# Patient Record
Sex: FEMALE | Race: WHITE | Employment: FULL TIME | URBAN - METROPOLITAN AREA
[De-identification: names, ages, dates, MRNs, and addresses within clinical notes are randomized per-mention and may not be internally consistent; named-entity substitution may affect disease eponyms.]

---

## 2018-01-16 ENCOUNTER — OFFICE VISIT (OUTPATIENT)
Dept: PLASTIC SURGERY | Facility: CLINIC | Age: 38
End: 2018-01-16
Payer: COMMERCIAL

## 2018-01-16 VITALS
OXYGEN SATURATION: 96 % | DIASTOLIC BLOOD PRESSURE: 81 MMHG | SYSTOLIC BLOOD PRESSURE: 129 MMHG | HEART RATE: 93 BPM | WEIGHT: 172.5 LBS | HEIGHT: 67 IN | BODY MASS INDEX: 27.07 KG/M2

## 2018-01-16 DIAGNOSIS — S31.40XA OPEN WOUND OF GENITAL LABIA, INITIAL ENCOUNTER: Primary | ICD-10-CM

## 2018-01-16 PROBLEM — L72.9 SKIN CYST: Status: ACTIVE | Noted: 2017-04-25

## 2018-01-16 PROBLEM — T14.8XXA OPEN WOUND: Status: ACTIVE | Noted: 2017-09-22

## 2018-01-16 RX ORDER — LEVALBUTEROL TARTRATE 45 UG/1
1 AEROSOL, METERED ORAL
COMMUNITY
Start: 2015-12-08

## 2018-01-16 RX ORDER — METHYLPHENIDATE HYDROCHLORIDE EXTENDED RELEASE 20 MG/1
20 TABLET ORAL
COMMUNITY
Start: 2018-01-29

## 2018-01-16 RX ORDER — NORGESTIMATE AND ETHINYL ESTRADIOL 0.25-0.035
1 KIT ORAL
COMMUNITY
Start: 2017-12-15

## 2018-01-16 RX ORDER — FEXOFENADINE HCL AND PSEUDOEPHEDRINE HCL 180; 240 MG/1; MG/1
1 TABLET, EXTENDED RELEASE ORAL
COMMUNITY
Start: 2014-10-14

## 2018-01-16 RX ORDER — AZELASTINE HCL 205.5 UG/1
SPRAY NASAL
COMMUNITY
Start: 2017-12-05

## 2018-01-16 RX ORDER — SUMATRIPTAN 100 MG/1
100 TABLET, FILM COATED ORAL
COMMUNITY
Start: 2017-04-30

## 2018-01-16 ASSESSMENT — PAIN SCALES - GENERAL: PAINLEVEL: MILD PAIN (3)

## 2018-01-16 NOTE — LETTER
"1/16/2018       RE: Natasha Woodruff  [address] [place] [address]     Dear Colleague,    Thank you for referring your patient, Natasha Woodruff, to the [facility] PLASTIC AND RECONSTRUCTIVE SURGERY at [facility]. Please see a copy of my visit note below.    PLASTICS NEW WOUND    This is a 37-year-old female who was referred by one of our medical product reps Augie Simon.  Apparently back in 10/2016, she developed a \"cyst\" involving kind of the right posterior labial/gluteal crease.  Her primary care provider suggested they use heat or something to bring it to the surface and ultimately it drained spontaneously and started to bleed and there was a \"tunnel\" in the tissues.  It sounds like she was not instructed on any kind of dressing cares and ultimately developed an infection and fever that was I&D'd and closed by her primary care provider.  This not surprisingly dehisced and she ended up with a general surgeon who did another I&D and a delayed primary closure along with a month of antibiotics but that also dehisced.  She has been worked up and is negative for any kind of STDs, any kind of autoimmune disease or any kind of inflammatory bowel disease or metabolic disease.  Just this past month on 12/12, she saw a general vascular surgeon and an ultrasound in the [facility] system showed a small pocket and tunnel that measured 9 x 7 x 5 mm and was filled hypoechoic fluid.  He took her to the OR, excised the area and closed it in what sounds like 2 layers.  The pathology came back as inflammatory and scar tissue, nothing suspicious.  It does not look like any cultures were sent.  While the majority of that incision is still intact, she is starting to have her what she calls cyclical process that she starts to see fluid underneath a small blister and ultimately that drains spontaneously.  The last time it drained, she noted a brown pasty kind of substance coming from the wound.  As " I mentioned before, she has never done any significant wound care packing.  She lives alone and is somewhat squeamish about this and it may be somewhat difficult just due to the position.  That being said, however, I have patients who have managed to figure out how to do their perineal dressing changes on their own.  She does sometimes use Bactroban on the surface of the wound and that seems to at least facilitate reepithelialization of the wound, but ultimately if there is any kind of bacteria trapped under that she will have this recurring drainage problem.  Currently, she is on doxycycline.  We will see what happens when she finishes this course.  The wound itself is less than 5 mm and is not really an open wound at this time.  It does, however, have the appearance of maybe hypertrophic granulation tissue in the middle of an old scar.  This is adjacent to the posterior vagina on the right, kind of in the crease between the inner thigh and the gluteal cleft.  There is hair in this area, but a lot of it has already come off with Band-Aids that she places there.  Some of the thoughts that come to mind is either pyoderma or localized hidradenitis, although she has no other disease elsewhere.  This could also just be a case of bacterial biofilm is an area where there are many bacteria that could be contaminating her wound or her closures.      SOCIAL HISTORY:  As mentioned, she lives alone.  She is a nonsmoker.  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      I gave her 2 options today.  The first is to just try using Vashe on a 3/4-inch packing gauze once it does spontaneously drain and break open.  She would need to insert that at least for 10 minutes and probably should keep it in all day if at all possible.      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  Otherwise, we can always add her on to our OR schedule, and I would do excision, exploration and delayed primary closure under general anesthesia.  We will then not only pack but also cultures and might even inject some dye to see if there is any kind of fistulous tract.  We also mentioned that I would try to close things in layers to eradicate any dead space after copious irrigation with triple antibiotic solution.  Any kind of suture material would be buried and sealed with Dermabond, although in this area it might not last for very long. Lastly, I would require her to offload this area as much as possible with regards to sitting.  None of these options sound terribly inviting to her, but she will let us know if she wants us to proceed with any kind of surgical approach.  Otherwise, I will leave things open on a p.r.n. basis if she develops a wound and needs to come back for followup on that.      Total time spent with this patient was at least 20 minutes, greater than half of which was definitely spent on trying to present surgical options as well as other alternative care plan.                Again, thank you for allowing me to participate in the care of your patient.      Sincerely,    Aaliyah De La Fuente MD

## 2018-01-16 NOTE — MR AVS SNAPSHOT
After Visit Summary   2018    Natasha Woodruff    MRN: [id number]           Patient Information     Date Of Birth          1980        Visit Information        Provider Department      2018 4:30 PM Aaliyah De La Fuente MD [facility] Plastic and Reconstructive Surgery        Today's Diagnoses     Open wound of genital labia, initial encounter    -  1       Follow-ups after your visit        Your next 10 appointments already scheduled     2018  4:15 PM CST   Nurse Visit with  Surgery Nurse   General Surgery ([facility])    [address] [place] [address]  4[address] [facility] [address] [phone]              Who to contact     Please call your clinic at [phone] to:    Ask questions about your health    Make or cancel appointments    Discuss your medicines    Learn about your test results    Speak to your doctor   If you have compliments or concerns about an experience at your clinic, or if you wish to file a complaint, please contact [facility] Patient Relations at [phone] or email us at [email or web address]         Additional Information About Your Visit        MyChart Information     Zumper is an electronic gateway that provides easy, online access to your medical records. With Zumper, you can request a clinic appointment, read your test results, renew a prescription or communicate with your care team.     To sign up for Zumper visit the website at [email or web address]   You will be asked to enter the access code listed below, as well as some personal information. Please follow the directions to create your username and password.     Your access code is: [email or web address]  Expires: 2018  6:30 AM     Your access code will  in 90 days. If you need help or a new code, please contact your [facility] or call [phone] for assistance.        Care EveryWhere ID      "This is your Care EveryWhere ID. This could be used by other organizations to access your [place] medical records  [id number]        Your Vitals Were     Pulse Height Pulse Oximetry BMI (Body Mass Index)          93 5' 6.5\" 96% 27.43 kg/m2         Blood Pressure from Last 3 Encounters:   01/16/18 129/81    Weight from Last 3 Encounters:   01/16/18 172 lb 8 oz              Today, you had the following     No orders found for display       Primary Care Provider    None Specified       No primary provider on file.        Equal Access to Services     KARLOS OLIVEIRA : Hadii aad ku hadasho Soomaali, waaxda luqadaha, qaybta kaalmada adeegyada, julia ascencioshoshanaasaf cole . So [facility] [phone].    ATENCIÓN: Si habla español, tiene a liu disposición servicios gratuitos de asistencia lingüística. Llame al [phone].    We comply with applicable federal civil rights laws and [place] laws. We do not discriminate on the basis of race, color, national origin, age, disability, sex, sexual orientation, or gender identity.            Thank you!     Thank you for choosing [facility] PLASTIC AND RECONSTRUCTIVE SURGERY  for your care. Our goal is always to provide you with excellent care. Hearing back from our patients is one way we can continue to improve our services. Please take a few minutes to complete the written survey that you may receive in the mail after your visit with us. Thank you!             Your Updated Medication List - Protect others around you: Learn how to safely use, store and throw away your medicines at www.disposemymeds.org.          This list is accurate as of: 1/16/18 11:59 PM.  Always use your most recent med list.                   Brand Name Dispense Instructions for use Diagnosis    Azelastine HCl 0.15 % Soln           fexofenadine-pseudoePHEDrine 180-240 MG per 24 hr tablet    ALLEGRA-D 24     Take 1 tablet by mouth        levalbuterol 45 MCG/ACT Inhaler    XOPENEX HFA     Inhale 1 puff " into the lungs        methylphenidate 20 MG CR tablet   Start taking on:  1/29/2018    METADATE ER     Take 20 mg by mouth        norgestimate-ethinyl estradiol 0.25-35 MG-MCG per tablet    ORTHO-CYCLEN, SPRINTEC     Take 1 tablet by mouth        SUMAtriptan 100 MG tablet    IMITREX     Take 100 mg by mouth

## 2018-01-16 NOTE — NURSING NOTE
"Chief Complaint   Patient presents with     Clinic Care Coordination - Initial     new       Vitals:    01/16/18 1631   BP: 129/81   Pulse: 93   SpO2: 96%   Weight: 172 lb 8 oz   Height: 5' 6.5\"       Body mass index is 27.43 kg/(m^2).    Smita HAYNES LPN                        "

## 2018-01-16 NOTE — PROGRESS NOTES
"PLASTICS NEW WOUND    This is a 37-year-old female who was referred by one of our medical product reps Augie Simon.  Apparently back in 10/2016, she developed a \"cyst\" involving kind of the right posterior labial/gluteal crease.  Her primary care provider suggested they use heat or something to bring it to the surface and ultimately it drained spontaneously and started to bleed and there was a \"tunnel\" in the tissues.  It sounds like she was not instructed on any kind of dressing cares and ultimately developed an infection and fever that was I&D'd and closed by her primary care provider.  This not surprisingly dehisced and she ended up with a general surgeon who did another I&D and a delayed primary closure along with a month of antibiotics but that also dehisced.  She has been worked up and is negative for any kind of STDs, any kind of autoimmune disease or any kind of inflammatory bowel disease or metabolic disease.  Just this past month on 12/12, she saw a general vascular surgeon and an ultrasound in the [facility] system showed a small pocket and tunnel that measured 9 x 7 x 5 mm and was filled hypoechoic fluid.  He took her to the OR, excised the area and closed it in what sounds like 2 layers.  The pathology came back as inflammatory and scar tissue, nothing suspicious.  It does not look like any cultures were sent.  While the majority of that incision is still intact, she is starting to have her what she calls cyclical process that she starts to see fluid underneath a small blister and ultimately that drains spontaneously.  The last time it drained, she noted a brown pasty kind of substance coming from the wound.  As I mentioned before, she has never done any significant wound care packing.  She lives alone and is somewhat squeamish about this and it may be somewhat difficult just due to the position.  That being said, however, I have patients who have managed to figure out how to do their perineal dressing " changes on their own.  She does sometimes use Bactroban on the surface of the wound and that seems to at least facilitate reepithelialization of the wound, but ultimately if there is any kind of bacteria trapped under that she will have this recurring drainage problem.  Currently, she is on doxycycline.  We will see what happens when she finishes this course.  The wound itself is less than 5 mm and is not really an open wound at this time.  It does, however, have the appearance of maybe hypertrophic granulation tissue in the middle of an old scar.  This is adjacent to the posterior vagina on the right, kind of in the crease between the inner thigh and the gluteal cleft.  There is hair in this area, but a lot of it has already come off with Band-Aids that she places there.  Some of the thoughts that come to mind is either pyoderma or localized hidradenitis, although she has no other disease elsewhere.  This could also just be a case of bacterial biofilm is an area where there are many bacteria that could be contaminating her wound or her closures.      SOCIAL HISTORY:  As mentioned, she lives alone.  She is a nonsmoker.  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      I gave her 2 options today.  The first is to just try using Vashe on a 3/4-inch packing gauze once it does spontaneously drain and break open.  She would need to insert that at least for 10 minutes and probably should keep it in all day if at all possible.      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  Otherwise, we can always add her on to our OR schedule, and I would do excision, exploration and delayed primary closure under general anesthesia.  We will then not only pack but also cultures and  might even inject some dye to see if there is any kind of fistulous tract.  We also mentioned that I would try to close things in layers to eradicate any dead space after copious irrigation with triple antibiotic solution.  Any kind of suture material would be buried and sealed with Dermabond, although in this area it might not last for very long. Lastly, I would require her to offload this area as much as possible with regards to sitting.  None of these options sound terribly inviting to her, but she will let us know if she wants us to proceed with any kind of surgical approach.  Otherwise, I will leave things open on a p.r.n. basis if she develops a wound and needs to come back for followup on that.      Total time spent with this patient was at least 20 minutes, greater than half of which was definitely spent on trying to present surgical options as well as other alternative care plan.

## 2018-01-16 NOTE — LETTER
Date:January 23, 2018      Patient was self referred, no letter generated. Do not send.        [facility] Health Information

## 2018-01-24 ENCOUNTER — ALLIED HEALTH/NURSE VISIT (OUTPATIENT)
Dept: SURGERY | Facility: CLINIC | Age: 38
End: 2018-01-24
Payer: COMMERCIAL

## 2018-01-24 DIAGNOSIS — T14.8XXA OPEN WOUND: Primary | ICD-10-CM

## 2018-01-24 NOTE — MR AVS SNAPSHOT
After Visit Summary   2018    Natasha Woodruff    MRN: [id number]           Patient Information     Date Of Birth          1980        Visit Information        Provider Department      2018 4:15 PM Nurse,  Surgery General Surgery        Today's Diagnoses     Open wound    -  1       Follow-ups after your visit        Your next 10 appointments already scheduled     2018  2:30 PM CST   (Arrive by 2:15 PM)   Return Plastic Surgery with Aaliyah De La Fuente MD   [facility] Plastic and Reconstructive Surgery ([facility])    [address] [place] [address] [phone]           Do not wear perfume.              Who to contact     Please call your clinic at [phone] to:    Ask questions about your health    Make or cancel appointments    Discuss your medicines    Learn about your test results    Speak to your doctor   If you have compliments or concerns about an experience at your clinic, or if you wish to file a complaint, please contact [facility] Patient Relations at [phone] or email us at [email or web address]         Additional Information About Your Visit        MyChart Information     [employer] is an electronic gateway that provides easy, online access to your medical records. With [employer], you can request a clinic appointment, read your test results, renew a prescription or communicate with your care team.     To sign up for [employer] visit the website at [email or web address]   You will be asked to enter the access code listed below, as well as some personal information. Please follow the directions to create your username and password.     Your access code is: [email or web address]  Expires: 2018  6:30 AM     Your access code will  in 90 days. If you need help or a new code, please contact your [facility] or call [phone] for assistance.         Care EveryWhere ID     This is your Care EveryWhere ID. This could be used by other organizations to access your [place] medical records  [id number]         Blood Pressure from Last 3 Encounters:   01/16/18 129/81    Weight from Last 3 Encounters:   01/16/18 172 lb 8 oz              Today, you had the following     No orders found for display       Primary Care Provider    None Specified       No primary provider on file.        Equal Access to Services     [facility]: Hadii aad ku hadasho Soomaali, waaxda luqadaha, qaybta kaalmada adeegyada, waxay marissain hayroselynn adeoneil gloriaasaf cole . So [facility] [phone].    ATENCIÓN: Si habla español, tiene a liu disposición servicios gratuitos de asistencia lingüística. Llame al [phone].    We comply with applicable federal civil rights laws and [place] laws. We do not discriminate on the basis of race, color, national origin, age, disability, sex, sexual orientation, or gender identity.            Thank you!     Thank you for choosing GENERAL SURGERY  for your care. Our goal is always to provide you with excellent care. Hearing back from our patients is one way we can continue to improve our services. Please take a few minutes to complete the written survey that you may receive in the mail after your visit with us. Thank you!             Your Updated Medication List - Protect others around you: Learn how to safely use, store and throw away your medicines at www.disposemymeds.org.          This list is accurate as of 1/24/18 11:59 PM.  Always use your most recent med list.                   Brand Name Dispense Instructions for use Diagnosis    Azelastine HCl 0.15 % Soln           fexofenadine-pseudoePHEDrine 180-240 MG per 24 hr tablet    ALLEGRA-D 24     Take 1 tablet by mouth        levalbuterol 45 MCG/ACT Inhaler    XOPENEX HFA     Inhale 1 puff into the lungs        methylphenidate 20 MG CR tablet    METADATE ER     Take 20 mg by mouth         norgestimate-ethinyl estradiol 0.25-35 MG-MCG per tablet    ORTHO-CYCLEN, SPRINTEC     Take 1 tablet by mouth        SUMAtriptan 100 MG tablet    IMITREX     Take 100 mg by mouth

## 2018-02-07 NOTE — PROGRESS NOTES
"Natasha Woodruff comes into clinic today at the request of Dr. De La Fuente Ordering Provider      Wound Check Action taken: Wound care and check and Pt. Instruction: Care of wound and action follow up of care for wound.     She has history of a Open surgical wound due to Surgical Wound:Post I&D Cyst drainage with previous provider and closure which redeveloped into a tunnel with drainage.     Clean gloves are donned and current dressing removed. Previous treatment includes: none  This is treatment week:1    Objective findings:      Location: right, kind of in the crease between the inner thigh and the gluteal cleft in the right posterior labial/gluteal crease    Wound measured.: L 0.3 cm x, W 0.3 cm x, D 0.6  cm, Full thickness.    Wound description: no sign of infection    Tenderness:sometimes    Odor: none     Not inflamed.    Drainage is light, serosanguinous and tan     Tunneling:  N/A      Undermining: N/A    Wound Base: not visible     Palpation of the wound bed:  firm    Slough appearance:         Eschar appearance:  none       Periwound Skin: intact,      Color: normal and consistent with surrounding tissue     Subjective finding:     Pt states: Wound is limiting her life due to place it is.  She is worried about causing infection whenever she urinates or has a bowel movement. She is also worried as she can't reach the area readily to clean or access the wound as effectively as a wound nurse can.  Distance is an issue.       Patient is assessed for discomfort which is: minimal    Today's status of the wound: unchanged    Treatment: Removal of existing dressing  Visual inspection  Cleansing with technicare solution  Irrigation with sterile saline solution  Application of sterile gauze soaked with Vashe for 10 mins  Wound packing with 1/8\" packing strip dampened with vashe and covered with 2x2 gauze covered with bandaid.       Pt received the following instructions:      Cleansing with PCMX Soap.Irrigate with " "Normal Saline Primary Dressing 1/8\" packing strip dampened with vashe. Secondary Dressing: 2x2 gauze.   Secure with: bandage.  Frequency:Once daily.   Signs and symptoms of infection taught.      Patient was given small tegaderm to place over dressing whenever she uses the toilet for bowel movements to help minimize issues with contamination from stool.     Patient needs to be seen 1 month. Treated and follow-up appointment made.     Marialuisa Grider CNP  was available for supervision of care if needed or if questions should arise and regarding plan of care.  Dary Diop LPN                  "

## 2018-02-20 ENCOUNTER — OFFICE VISIT (OUTPATIENT)
Dept: PLASTIC SURGERY | Facility: CLINIC | Age: 38
End: 2018-02-20
Payer: COMMERCIAL

## 2018-02-20 VITALS
OXYGEN SATURATION: 98 % | SYSTOLIC BLOOD PRESSURE: 136 MMHG | TEMPERATURE: 98.5 F | HEART RATE: 98 BPM | DIASTOLIC BLOOD PRESSURE: 78 MMHG | WEIGHT: 174 LBS | BODY MASS INDEX: 27.31 KG/M2 | HEIGHT: 67 IN

## 2018-02-20 DIAGNOSIS — S31.809D OPEN WOUND OF BUTTOCK, UNSPECIFIED LATERALITY, SUBSEQUENT ENCOUNTER: Primary | ICD-10-CM

## 2018-02-20 ASSESSMENT — PAIN SCALES - GENERAL: PAINLEVEL: NO PAIN (0)

## 2018-02-20 NOTE — LETTER
2/20/2018       RE: Natasha Woodruff  [address] [place] [address]     Dear Colleague,    Thank you for referring your patient, Natasha Woodruff, to the [facility] PLASTIC AND RECONSTRUCTIVE SURGERY at [facility]. Please see a copy of my visit note below.    PLASTICS WOUND     This 37 year old female with a chronic perineal sinus tract has been packing the tunnel with recommended dressings but there is still purulent drainage present.  Activity makes the area for irritated/infected.    We discussed trying to debride and close this wound but this would require 3 weeks of no sitting and she does not have the PTO for that much time. Neither could she easily do her job without sitting at work.    She would be interested in just trying an I&D on a Friday, but NOT closure, so that she can at least sit. She would continue her dressing changes and see how things healed. This could be done at the [facility] under [place] x 1 hour max. We would need to send specimens to path for eval.     We will let her know about scheduling.       Again, thank you for allowing me to participate in the care of your patient.      Sincerely,    Aaliyah De La Fuente MD

## 2018-02-20 NOTE — LETTER
Date:March 26, 2018      Patient was self referred, no letter generated. Do not send.        [facility] Health Information

## 2018-02-20 NOTE — MR AVS SNAPSHOT
After Visit Summary   2/20/2018    Natasha Woodruff    MRN: [id number]           Patient Information     Date Of Birth          1980        Visit Information        Provider Department      2/20/2018 2:30 PM Aaliyah De La Fuente MD [facility] Plastic and Reconstructive Surgery        Today's Diagnoses     Open wound of buttock, unspecified laterality, subsequent encounter    -  1       Follow-ups after your visit        Your next 10 appointments already scheduled     Apr 20, 2018   Procedure with Aaliyah De La Fuente MD   [facility] ([facility] [place])    [address] [place] [address]  5th [facility] [id number] [phone]           Located in the Clinics and Surgery Center at [address] [place] [address].   parking is very convenient and highly recommended.  is a $6 flat rate fee.  Both  and self parkers should enter the main arrival plaza from [facility]; parking attendants will direct you based on your parking preference.            May 01, 2018  4:30 PM CDT   (Arrive by 4:15 PM)   Post-Op with Aaliyah De La Fuente MD   [facility] Plastic and Reconstructive Surgery ([facility])    [address] [place] [address]  4th [facility] [id number] [phone]              Who to contact     Please call your clinic at [phone] to:    Ask questions about your health    Make or cancel appointments    Discuss your medicines    Learn about your test results    Speak to your doctor            Additional Information About Your Visit        MyChart Information     Mobile Fuelt is an electronic gateway that provides easy, online access to your medical records. With ZikBit, you can request a clinic appointment, read your test results, renew a prescription or communicate with your care team.     To sign up for Mobile Fuelt visit the website at [email or web address]   You will be asked to enter the access  "code listed below, as well as some personal information. Please follow the directions to create your username and password.     Your access code is: [email or web address]  Expires: 2018  7:30 AM     Your access code will  in 90 days. If you need help or a new code, please contact your [facility] or call [phone] for assistance.        Care EveryWhere ID     This is your Care EveryWhere ID. This could be used by other organizations to access your [place] medical records  [id number]        Your Vitals Were     Pulse Temperature Height Pulse Oximetry BMI (Body Mass Index)       98 98.5  F (36.9  C) (Oral) 5' 6.5\" 98% 27.66 kg/m2        Blood Pressure from Last 3 Encounters:   18 136/78   18 129/81    Weight from Last 3 Encounters:   18 174 lb   18 172 lb 8 oz              We Performed the Following     Priti-Operative Worksheet ([facility])        Primary Care Provider    None Specified       No primary provider on file.        Equal Access to Services     [facility]: Hadii laura jolly hadleesao Soclaritza, waaxda luqadaha, qaybta kaalmada albaro, julia cole . So [facility] [phone].    ATENCIÓN: Si habla español, tiene a liu disposición servicios gratuitos de asistencia lingüística. Llame al [phone].    We comply with applicable federal civil rights laws and [place] laws. We do not discriminate on the basis of race, color, national origin, age, disability, sex, sexual orientation, or gender identity.            Thank you!     Thank you for choosing [facility] PLASTIC AND RECONSTRUCTIVE SURGERY  for your care. Our goal is always to provide you with excellent care. Hearing back from our patients is one way we can continue to improve our services. Please take a few minutes to complete the written survey that you may receive in the mail after your visit with us. Thank you!             Your Updated Medication List - " Protect others around you: Learn how to safely use, store and throw away your medicines at www.disposemymeds.org.          This list is accurate as of 2/20/18 11:59 PM.  Always use your most recent med list.                   Brand Name Dispense Instructions for use Diagnosis    Azelastine HCl 0.15 % Soln           fexofenadine-pseudoePHEDrine 180-240 MG per 24 hr tablet    ALLEGRA-D 24     Take 1 tablet by mouth        levalbuterol 45 MCG/ACT Inhaler    XOPENEX HFA     Inhale 1 puff into the lungs        methylphenidate 20 MG CR tablet    METADATE ER     Take 20 mg by mouth        norgestimate-ethinyl estradiol 0.25-35 MG-MCG per tablet    ORTHO-CYCLEN, SPRINTEC     Take 1 tablet by mouth        SUMAtriptan 100 MG tablet    IMITREX     Take 100 mg by mouth

## 2018-02-20 NOTE — NURSING NOTE
"Chief Complaint   Patient presents with     Clinic Care Coordination - Follow-up     return        Vitals:    02/20/18 1413   BP: 136/78   Pulse: 98   Temp: 98.5  F (36.9  C)   TempSrc: Oral   SpO2: 98%   Weight: 174 lb   Height: 5' 6.5\"       Body mass index is 27.66 kg/(m^2).    Smita HAYNES LPN                     "

## 2018-03-14 ENCOUNTER — TELEPHONE (OUTPATIENT)
Dept: PLASTIC SURGERY | Facility: CLINIC | Age: 38
End: 2018-03-14

## 2018-03-14 NOTE — TELEPHONE ENCOUNTER
Patient called stating at she had no idea that she was scheduled for surgery. I told her that Dr. De La Fuente had picked the date and I mailed packet to her. She never got the packet she said. We got new date and I told her was very sorry for the misunderstanding. She said that's okay. I asked if she would like a new packet she said no.

## 2018-03-14 NOTE — TELEPHONE ENCOUNTER
3/13/2018 Patient called in and left a voicemail for Dr. De La Fuente's nurse, asking about when surgery was going to be scheduled stating that when she called the surgery scheduler today, the  stated she couldn't do anything until the doctor put her orders in.   3/14/2018 Nurse coordinator returned call to patient to let her know that the surgery was scheduled for 3/30/18 and to give her a call back.      Dary Diop LPN

## 2018-03-25 NOTE — PROGRESS NOTES
PLASTICS WOUND     This 37 year old female with a chronic perineal sinus tract has been packing the tunnel with recommended dressings but there is still purulent drainage present.  Activity makes the area for irritated/infected.    We discussed trying to debride and close this wound but this would require 3 weeks of no sitting and she does not have the PTO for that much time. Neither could she easily do her job without sitting at work.    She would be interested in just trying an I&D on a Friday, but NOT closure, so that she can at least sit. She would continue her dressing changes and see how things healed. [place] [facility] [place] x 1 hour max. We would need to send specimens to path for eval.     We will let her know about scheduling.

## 2018-04-05 ENCOUNTER — TELEPHONE (OUTPATIENT)
Dept: PLASTIC SURGERY | Facility: CLINIC | Age: 38
End: 2018-04-05

## 2018-04-05 NOTE — TELEPHONE ENCOUNTER
Patient called and left a voicemail message Wednesday 4:45 pm, with concerns that her original surgery and post op appointment had been scheduled all without calling and telling her.  The patient continued with the message to state that she had rescheduled the surgery for April 20th but still had not been told when her post operative appointment was and no new surgery packet had been mailed out with the updated surgery date and information.      Patient has previously expressed that she has experienced communication difficulties with her previous providers and is extremely anxious about problems and issues that may arise and no communication or not enough communication.      Dary Diop LPN

## 2018-04-05 NOTE — TELEPHONE ENCOUNTER
"Nurse Care Coordinator returned voicemail message to patient about when her post operative appointment with Dr. De La Fuente will be.  Let her know that it is on 5/1/18 at 4:30 pm.      Patient stated she \"just wants this to be done\".  Nurse also left her number if patient has any other questions or concerns for her to call.     Dary Diop LPN      "

## 2018-04-17 ENCOUNTER — TELEPHONE (OUTPATIENT)
Dept: PLASTIC SURGERY | Facility: CLINIC | Age: 38
End: 2018-04-17

## 2018-04-19 ENCOUNTER — ANESTHESIA EVENT (OUTPATIENT)
Dept: SURGERY | Facility: AMBULATORY SURGERY CENTER | Age: 38
End: 2018-04-19

## 2018-04-20 ENCOUNTER — SURGERY (OUTPATIENT)
Age: 38
End: 2018-04-20

## 2018-04-20 ENCOUNTER — ANESTHESIA (OUTPATIENT)
Dept: SURGERY | Facility: AMBULATORY SURGERY CENTER | Age: 38
End: 2018-04-20

## 2018-04-20 ENCOUNTER — HOSPITAL ENCOUNTER (OUTPATIENT)
Facility: AMBULATORY SURGERY CENTER | Age: 38
End: 2018-04-20
Attending: SURGERY
Payer: COMMERCIAL

## 2018-04-20 VITALS
TEMPERATURE: 98.4 F | DIASTOLIC BLOOD PRESSURE: 56 MMHG | WEIGHT: 174 LBS | RESPIRATION RATE: 16 BRPM | OXYGEN SATURATION: 98 % | HEART RATE: 63 BPM | HEIGHT: 66 IN | SYSTOLIC BLOOD PRESSURE: 117 MMHG | BODY MASS INDEX: 27.97 KG/M2

## 2018-04-20 DIAGNOSIS — K60.30 ANAL FISTULA: Primary | ICD-10-CM

## 2018-04-20 LAB
HCG UR QL: NEGATIVE
INTERNAL QC OK POCT: YES

## 2018-04-20 RX ORDER — FENTANYL CITRATE 50 UG/ML
25-50 INJECTION, SOLUTION INTRAMUSCULAR; INTRAVENOUS
Status: DISCONTINUED | OUTPATIENT
Start: 2018-04-20 | End: 2018-04-21 | Stop reason: HOSPADM

## 2018-04-20 RX ORDER — KETOROLAC TROMETHAMINE 30 MG/ML
INJECTION, SOLUTION INTRAMUSCULAR; INTRAVENOUS PRN
Status: DISCONTINUED | OUTPATIENT
Start: 2018-04-20 | End: 2018-04-20

## 2018-04-20 RX ORDER — ONDANSETRON 2 MG/ML
4 INJECTION INTRAMUSCULAR; INTRAVENOUS EVERY 30 MIN PRN
Status: DISCONTINUED | OUTPATIENT
Start: 2018-04-20 | End: 2018-04-21 | Stop reason: HOSPADM

## 2018-04-20 RX ORDER — SODIUM CHLORIDE, SODIUM LACTATE, POTASSIUM CHLORIDE, CALCIUM CHLORIDE 600; 310; 30; 20 MG/100ML; MG/100ML; MG/100ML; MG/100ML
INJECTION, SOLUTION INTRAVENOUS CONTINUOUS
Status: DISCONTINUED | OUTPATIENT
Start: 2018-04-20 | End: 2018-04-21 | Stop reason: HOSPADM

## 2018-04-20 RX ORDER — ONDANSETRON 4 MG/1
4 TABLET, ORALLY DISINTEGRATING ORAL EVERY 30 MIN PRN
Status: DISCONTINUED | OUTPATIENT
Start: 2018-04-20 | End: 2018-04-21 | Stop reason: HOSPADM

## 2018-04-20 RX ORDER — ALBUTEROL SULFATE 0.83 MG/ML
2.5 SOLUTION RESPIRATORY (INHALATION) EVERY 4 HOURS PRN
Status: DISCONTINUED | OUTPATIENT
Start: 2018-04-20 | End: 2018-04-21 | Stop reason: HOSPADM

## 2018-04-20 RX ORDER — LIDOCAINE HYDROCHLORIDE 20 MG/ML
INJECTION, SOLUTION INFILTRATION; PERINEURAL PRN
Status: DISCONTINUED | OUTPATIENT
Start: 2018-04-20 | End: 2018-04-20

## 2018-04-20 RX ORDER — DEXAMETHASONE SODIUM PHOSPHATE 4 MG/ML
INJECTION, SOLUTION INTRA-ARTICULAR; INTRALESIONAL; INTRAMUSCULAR; INTRAVENOUS; SOFT TISSUE PRN
Status: DISCONTINUED | OUTPATIENT
Start: 2018-04-20 | End: 2018-04-20

## 2018-04-20 RX ORDER — PROPOFOL 10 MG/ML
INJECTION, EMULSION INTRAVENOUS PRN
Status: DISCONTINUED | OUTPATIENT
Start: 2018-04-20 | End: 2018-04-20

## 2018-04-20 RX ORDER — BUPIVACAINE HYDROCHLORIDE AND EPINEPHRINE 5; 5 MG/ML; UG/ML
INJECTION, SOLUTION PERINEURAL PRN
Status: DISCONTINUED | OUTPATIENT
Start: 2018-04-20 | End: 2018-04-20 | Stop reason: HOSPADM

## 2018-04-20 RX ORDER — DEXAMETHASONE SODIUM PHOSPHATE 4 MG/ML
4 INJECTION, SOLUTION INTRA-ARTICULAR; INTRALESIONAL; INTRAMUSCULAR; INTRAVENOUS; SOFT TISSUE EVERY 10 MIN PRN
Status: DISCONTINUED | OUTPATIENT
Start: 2018-04-20 | End: 2018-04-21 | Stop reason: HOSPADM

## 2018-04-20 RX ORDER — FENTANYL CITRATE 50 UG/ML
INJECTION, SOLUTION INTRAMUSCULAR; INTRAVENOUS PRN
Status: DISCONTINUED | OUTPATIENT
Start: 2018-04-20 | End: 2018-04-20

## 2018-04-20 RX ORDER — GABAPENTIN 300 MG/1
300 CAPSULE ORAL ONCE
Status: COMPLETED | OUTPATIENT
Start: 2018-04-20 | End: 2018-04-20

## 2018-04-20 RX ORDER — ONDANSETRON 2 MG/ML
INJECTION INTRAMUSCULAR; INTRAVENOUS PRN
Status: DISCONTINUED | OUTPATIENT
Start: 2018-04-20 | End: 2018-04-20

## 2018-04-20 RX ORDER — HYDROMORPHONE HYDROCHLORIDE 1 MG/ML
.3-.5 INJECTION, SOLUTION INTRAMUSCULAR; INTRAVENOUS; SUBCUTANEOUS EVERY 10 MIN PRN
Status: DISCONTINUED | OUTPATIENT
Start: 2018-04-20 | End: 2018-04-21 | Stop reason: HOSPADM

## 2018-04-20 RX ORDER — ACETAMINOPHEN 325 MG/1
975 TABLET ORAL ONCE
Status: COMPLETED | OUTPATIENT
Start: 2018-04-20 | End: 2018-04-20

## 2018-04-20 RX ORDER — OXYCODONE HYDROCHLORIDE 5 MG/1
5 TABLET ORAL EVERY 4 HOURS PRN
Status: DISCONTINUED | OUTPATIENT
Start: 2018-04-20 | End: 2018-04-21 | Stop reason: HOSPADM

## 2018-04-20 RX ORDER — LIDOCAINE 40 MG/G
CREAM TOPICAL
Status: DISCONTINUED | OUTPATIENT
Start: 2018-04-20 | End: 2018-04-21 | Stop reason: HOSPADM

## 2018-04-20 RX ORDER — OXYCODONE HYDROCHLORIDE 5 MG/1
5-10 TABLET ORAL EVERY 6 HOURS PRN
Qty: 50 TABLET | Refills: 0 | Status: SHIPPED | OUTPATIENT
Start: 2018-04-20

## 2018-04-20 RX ORDER — PROPOFOL 10 MG/ML
INJECTION, EMULSION INTRAVENOUS CONTINUOUS PRN
Status: DISCONTINUED | OUTPATIENT
Start: 2018-04-20 | End: 2018-04-20

## 2018-04-20 RX ORDER — MEPERIDINE HYDROCHLORIDE 25 MG/ML
12.5 INJECTION INTRAMUSCULAR; INTRAVENOUS; SUBCUTANEOUS
Status: DISCONTINUED | OUTPATIENT
Start: 2018-04-20 | End: 2018-04-21 | Stop reason: HOSPADM

## 2018-04-20 RX ORDER — NALOXONE HYDROCHLORIDE 0.4 MG/ML
.1-.4 INJECTION, SOLUTION INTRAMUSCULAR; INTRAVENOUS; SUBCUTANEOUS
Status: DISCONTINUED | OUTPATIENT
Start: 2018-04-20 | End: 2018-04-21 | Stop reason: HOSPADM

## 2018-04-20 RX ADMIN — ACETAMINOPHEN 975 MG: 325 TABLET ORAL at 07:00

## 2018-04-20 RX ADMIN — OXYCODONE HYDROCHLORIDE 5 MG: 5 TABLET ORAL at 11:43

## 2018-04-20 RX ADMIN — PROPOFOL 200 MG: 10 INJECTION, EMULSION INTRAVENOUS at 09:16

## 2018-04-20 RX ADMIN — BUPIVACAINE HYDROCHLORIDE AND EPINEPHRINE 10 ML: 5; 5 INJECTION, SOLUTION PERINEURAL at 10:48

## 2018-04-20 RX ADMIN — KETOROLAC TROMETHAMINE 30 MG: 30 INJECTION, SOLUTION INTRAMUSCULAR; INTRAVENOUS at 10:06

## 2018-04-20 RX ADMIN — DEXAMETHASONE SODIUM PHOSPHATE 4 MG: 4 INJECTION, SOLUTION INTRA-ARTICULAR; INTRALESIONAL; INTRAMUSCULAR; INTRAVENOUS; SOFT TISSUE at 09:20

## 2018-04-20 RX ADMIN — PROPOFOL: 10 INJECTION, EMULSION INTRAVENOUS at 09:59

## 2018-04-20 RX ADMIN — LIDOCAINE HYDROCHLORIDE 80 MG: 20 INJECTION, SOLUTION INFILTRATION; PERINEURAL at 09:16

## 2018-04-20 RX ADMIN — PROPOFOL 200 MCG/KG/MIN: 10 INJECTION, EMULSION INTRAVENOUS at 09:16

## 2018-04-20 RX ADMIN — FENTANYL CITRATE 50 MCG: 50 INJECTION, SOLUTION INTRAMUSCULAR; INTRAVENOUS at 09:13

## 2018-04-20 RX ADMIN — FENTANYL CITRATE 50 MCG: 50 INJECTION, SOLUTION INTRAMUSCULAR; INTRAVENOUS at 09:47

## 2018-04-20 RX ADMIN — PROPOFOL: 10 INJECTION, EMULSION INTRAVENOUS at 10:18

## 2018-04-20 RX ADMIN — ONDANSETRON 4 MG: 2 INJECTION INTRAMUSCULAR; INTRAVENOUS at 09:16

## 2018-04-20 RX ADMIN — GABAPENTIN 300 MG: 300 CAPSULE ORAL at 07:00

## 2018-04-20 RX ADMIN — PROPOFOL: 10 INJECTION, EMULSION INTRAVENOUS at 10:36

## 2018-04-20 RX ADMIN — SODIUM CHLORIDE, SODIUM LACTATE, POTASSIUM CHLORIDE, CALCIUM CHLORIDE: 600; 310; 30; 20 INJECTION, SOLUTION INTRAVENOUS at 07:01

## 2018-04-20 NOTE — IP AVS SNAPSHOT
"                  MRN:[id number]                      After Visit Summary   4/20/2018    Natasha Woodruff    MRN: [id number]           Thank you!     Thank you for choosing [place] for your care. Our goal is always to provide you with excellent care. Hearing back from our patients is one way we can continue to improve our services. Please take a few minutes to complete the written survey that you may receive in the mail after you visit with us. Thank you!        Patient Information     Date Of Birth          1980        About your hospital stay     You were admitted on:  April 20, 2018 [facility]    You were discharged on:  April 20, 2018        Reason for your hospital stay       S/p right perineal sinus tract I&D. Found anal fistula. Dr Raman Mccallum from Colorectal Surgery did an intraop consult for fistulotomy and seton placement.  Tolerated procedure under GLMA. Used 10 mls of 0.5% marcaine with epi for field block after I&D. Wound packed with 1\" iodoform gauze.  OK to dc home per anesthesia criteria.                  Who to Call     For medical emergencies, please call 911.  For non-urgent questions about your medical care, please call your primary care provider or clinic, None  For questions related to your surgery, please call your surgery clinic        Attending Provider     Provider Specialty    Aaliyah De La Fuente MD Plastic Surgery       Primary Care Provider    None Specified      After Care Instructions     Activity       Your activity upon discharge: activity as tolerated. May want to avoid sitting on wound area for very long.            Diet       Follow this diet upon discharge: Advance to a regular diet as tolerated            Supplies       List the supplies the pt needs to go home.  PLEASE send home with the remaining Iodoform bottle, and a spare peripad or ABD and mesh panties.THANKS!            Wound care and dressings       " "Instructions to care for your wound at home: as directed.  OK to shower the wound. Replace 1\" Iodoform packing QD and PRN soilage from using bathroom.  Cover with pad and hold in place with panties.                  Follow-up Appointments     Follow Up and recommended labs and tests       Follow up with Dr. Mccallum and myself , at (location with clinic name or city) [facility] clinics, , within 1-2 weeks  to evaluate after surgery. No follow up labs or test are needed.  If she can see Dr Mccallum next week when I am not in clinic, I can see her the following week. We both have clinic on tuesdays.                  Your next 10 appointments already scheduled     May 01, 2018  4:30 PM CDT   (Arrive by 4:15 PM)   Post-Op with Aaliyah De La Fuente MD   [facility] Plastic and Reconstructive Surgery ([facility])    [address] [place] [id number] [phone]              Further instructions from your care team       [facility]  Home Care Following Anesthesia  For 24 hours after surgery:  1. Get plenty of rest.  A responsible adult must stay with you for at least 24 hours after you leave the surgery center.  2. Do not drive or use heavy equipment.  If you have weakness or tingling, don't drive or use heavy equipment until this feeling goes away.   3. Do not drink alcohol.   4. Avoid strenuous or risky activities.  Ask for help when climbing stairs.  5. You may feel lightheaded.  IF so, sit for a few minutes before standing.  Have someone help you get up.   6. If you have nausea (feel sick to your stomach): Drink only clear liquids such as apple juice, ginger ale, broth or 7-Up.  Rest may also help.  Be sure to drink enough fluids.  Move to a regular diet as you feel able.   7. You may have a slight fever.  Call the doctor if your fever is over 100 F (37.7 C) (taken under the tongue) or lasts longer than 24 " "hours.  8. You may have a dry mouth, a sore throat, muscle aches or trouble sleeping. These should go away after 24 hours.  9. Do not make important or legal decisions.        Today you received a Marcaine or bupivacaine block to numb the nerves near your surgery site.  This is a block using local anesthetic or \"numbing\" medication injected around the nerves to anesthetize or \"numb\" the area supplied by those nerves.  This block is injected into the muscle layer near your surgical site.  The medication may numb the location where you had surgery for 6-18 hours, but may last up to 24 hours.  If your surgical site is an arm or leg you should be careful with your affected limb, since it is possible to injure your limb without being aware of it due to the numbing.  Until full feeling returns, you should guard against bumping or hitting your limb, and avoid extreme hot or cold temperatures on the skin.  As the block wears off, the feeling will return as a tingling or prickly sensation near your surgical site.  You will experience more discomfort from your incision as the feeling returns.  You may want to take a pain pill (a narcotic or Tylenol if this was prescribed by your surgeon) when you start to experience mild pain before the pain beccomes more severe.  If your pain medications do not control your pain you should notifiy your surgeon.    Tips for taking pain medications  To get the best pain relief possible, remember these points:    Take pain medications as directed, before pain becomes severe.    Pain medication can upset your stomach: taking it with food may help.    Constipation is a common side effect of pain medication. Drink plenty of  fluids.    Eat foods high in fiber. Take a stool softener if recommended by your doctor or pharmacist.    Do not drink alcohol, drive or operate machinery while taking pain medications.    Ask about other ways to control pain, such as with heat, ice or " "relaxation.    Tylenol/Acetaminophen Consumption  To help encourage the safe use of acetaminophen, the makers of TYLENOL  have lowered the maximum daily dose for single-ingredient Extra Strength TYLENOL  (acetaminophen) products sold in the [place] from 8 pills per day (4,000 mg) to 6 pills per day (3,000 mg). The dosing interval has also changed from 2 pills every 4-6 hours to 2 pills every 6 hours.    If you feel your pain relief is insufficient, you may take Tylenol/Acetaminophen in addition to your narcotic pain medication.     Be careful not to exceed 3,000 mg of Tylenol/Acetaminophen in a 24 hour period from all sources.    If you are taking extra strength Tylenol/acetaminophen (500 mg), the maximum dose is 6 tablets in 24 hours.    If you are taking regular strength acetaminophen (325 mg), the maximum dose is 9 tablets in 24 hours.    Call a doctor for any of the followin. Signs of infection (fever, growing tenderness at the surgery site, a large amount of drainage or bleeding, severe pain, foul-smelling drainage, redness, swelling).  2. It has been over 8 to 10 hours since surgery and you are still not able to urinate (pass water).  3. Headache for over 24 hours.  Your doctor is:  Dr. Aaliyah De La Fuente, Plastic Surgery: [phone]                 Or dial [phone] and ask for the resident on call for:  Plastics  For emergency care, call the:  [place] [facility]:  [phone] (TTY for hearing impaired: [phone])                Pending Results     No orders found from 2018 to 2018.            Admission Information     Date & Time Provider Department Dept. Phone    2018 Aaliyah De La Fuente MD [facility] [phone]      Your Vitals Were     Blood Pressure Pulse Temperature Respirations Height Weight    115/58 63 98.7  F (37.1  C) (Temporal) 14 1.676 m (5' 6\") 78.9 kg (174 lb)    Last Period Pulse Oximetry BMI (Body Mass Index)             " 04/10/2018 98% 28.08 kg/m2         [employer] Information     Entigo is an electronic gateway that provides easy, online access to your medical records. With Entigo, you can request a clinic appointment, read your test results, renew a prescription or communicate with your care team.     To sign up for Entigo visit the website at [email or web address]   You will be asked to enter the access code listed below, as well as some personal information. Please follow the directions to create your username and password.     Your access code is: [email or web address]  Expires: 2018  6:30 AM     Your access code will  in 90 days. If you need help or a new code, please contact your [facility] or call [phone] for assistance.        Care EveryWhere ID     This is your Care EveryWhere ID. This could be used by other organizations to access your [place] medical records  [id number]        Equal Access to Services     [facility]: Hadbetito yino Soclaritza, waaxda luqadaha, qaybta kaalmada adeegyadarline, julia cole . So [facility] [phone].    ATENCIÓN: Si nadja alvarez, tiene a liu disposición servicios gratuitos de asistencia lingüística. Llame al [phone].    We comply with applicable federal civil rights laws and [place] laws. We do not discriminate on the basis of race, color, national origin, age, disability, sex, sexual orientation, or gender identity.               Review of your medicines      START taking        Dose / Directions    oxyCODONE IR 5 MG tablet   Commonly known as:  ROXICODONE   Used for:  Anal fistula        Dose:  5-10 mg   Take 1-2 tablets (5-10 mg) by mouth every 6 hours as needed for moderate to severe pain or severe pain maximum 6 tablet(s) per day   Quantity:  50 tablet   Refills:  0         CONTINUE these medicines which have NOT CHANGED        Dose / Directions    Azelastine HCl 0.15 % Soln        Refills:  0        fexofenadine-pseudoePHEDrine 180-240 MG per 24 hr tablet   Commonly known as:  ALLEGRA-D 24        Dose:  1 tablet   Take 1 tablet by mouth   Refills:  0       levalbuterol 45 MCG/ACT Inhaler   Commonly known as:  XOPENEX HFA        Dose:  1 puff   Inhale 1 puff into the lungs   Refills:  0       methylphenidate 20 MG CR tablet   Commonly known as:  METADATE ER        Dose:  20 mg   Take 20 mg by mouth   Refills:  0       norgestimate-ethinyl estradiol 0.25-35 MG-MCG per tablet   Commonly known as:  ORTHO-CYCLEN, SPRINTEC        Dose:  1 tablet   Take 1 tablet by mouth   Refills:  0       SUMAtriptan 100 MG tablet   Commonly known as:  IMITREX        Dose:  100 mg   Take 100 mg by mouth   Refills:  0            Where to get your medicines      Some of these will need a paper prescription and others can be bought over the counter. Ask your nurse if you have questions.     Bring a paper prescription for each of these medications     oxyCODONE IR 5 MG tablet                Protect others around you: Learn how to safely use, store and throw away your medicines at www.disposemymeds.org.        Information about OPIOIDS     PRESCRIPTION OPIOIDS: WHAT YOU NEED TO KNOW   You have a prescription for an opioid (narcotic) pain medicine. Opioids can cause addiction. If you have a history of chemical dependency of any type, you are at a higher risk of becoming addicted to opioids. Only take this medicine after all other options have been tried. Take it for as short a time and as few doses as possible.     Do not:    Drive. If you drive while taking these medicines, you could be arrested for driving under the influence (DUI).    Operate heavy machinery    Do any other dangerous activities while taking these medicines.     Drink any alcohol while taking these medicines.      Take with any other medicines that contain acetaminophen. Read all labels carefully. Look for the word  acetaminophen  or  Tylenol.  Ask your pharmacist if  you have questions or are unsure.    Store your pills in a secure place, locked if possible. We will not replace any lost or stolen medicine. If you don t finish your medicine, please throw away (dispose) as directed by your pharmacist. The [place] Pollution Control Agency has more information about safe disposal: https://www.pca.[facility].[place].us/living-green/managing-unwanted-medications    All opioids tend to cause constipation. Drink plenty of water and eat foods that have a lot of fiber, such as fruits, vegetables, prune juice, apple juice and high-fiber cereal. Take a laxative (Miralax, milk of magnesia, Colace, Senna) if you don t move your bowels at least every other day.              Medication List: This is a list of all your medications and when to take them. Check marks below indicate your daily home schedule. Keep this list as a reference.      Medications           Morning Afternoon Evening Bedtime As Needed    Azelastine HCl 0.15 % Soln                                fexofenadine-pseudoePHEDrine 180-240 MG per 24 hr tablet   Commonly known as:  ALLEGRA-D 24   Take 1 tablet by mouth                                levalbuterol 45 MCG/ACT Inhaler   Commonly known as:  XOPENEX HFA   Inhale 1 puff into the lungs                                methylphenidate 20 MG CR tablet   Commonly known as:  METADATE ER   Take 20 mg by mouth                                norgestimate-ethinyl estradiol 0.25-35 MG-MCG per tablet   Commonly known as:  ORTHO-CYCLEN, SPRINTEC   Take 1 tablet by mouth                                oxyCODONE IR 5 MG tablet   Commonly known as:  ROXICODONE   Take 1-2 tablets (5-10 mg) by mouth every 6 hours as needed for moderate to severe pain or severe pain maximum 6 tablet(s) per day                                SUMAtriptan 100 MG tablet   Commonly known as:  IMITREX   Take 100 mg by mouth

## 2018-04-20 NOTE — BRIEF OP NOTE
"Brief Op Note    Pre Op Diagnosis: right posterior perianal persistent sinus tract  Post Op Diagnosis: same  Procedure: subcutaneous fistulectomy. Anal fistula exploration and seton placement by Dr Mccallum.  Anesthesia:GLMA  Surgeon: Yakelin De La Fuente MD  Co-Surgeon: Dr Rafat Mccallum MD (Colorectal)  EBL: 5 ml  IV fluids:see anesthesia record  Urine output:na  Counts: correct  Specimens: na  Drain: none  Condition: stable  Findings:sinus tract probed and injected with methylene blue dye. Found to have anal fistula. Intraop consult with Dr Mccallum. Fistulotomy and seton placed. Injected 10 mls of 0.5% marcaine with epi for field block. Wound I&D site packed with 1\" Iodoform gauze.    Follow up with Dr Mccallum in 1-2 weeks. We can see her for her wound at the same time.      Yakelin De La Fuente MD    "

## 2018-04-20 NOTE — ANESTHESIA POSTPROCEDURE EVALUATION
Patient: Natasha Woodruff    Procedure(s):  Debridement Perineal Sinus Tract, Fistulotomy with seton placement - Wound Class: II-Clean Contaminated    Diagnosis:Chronic Perineal Sinus Tract  Diagnosis Additional Information: No value filed.    Anesthesia Type:  General, LMA    Note:  Anesthesia Post Evaluation    Patient location during evaluation: Phase 2  Patient participation: Able to fully participate in evaluation  Level of consciousness: awake and alert  Pain management: adequate  Airway patency: patent  Cardiovascular status: acceptable  Respiratory status: acceptable  Hydration status: acceptable  PONV: none     Anesthetic complications: None          Last vitals:  Vitals:    04/20/18 0635 04/20/18 1100 04/20/18 1115   BP: 119/72 115/58 114/63   Pulse: 63     Resp: 15 14 18   Temp: 36.7  C (98.1  F) 37.1  C (98.7  F) 37.1  C (98.7  F)   SpO2: 95% 98% 99%         Electronically Signed By: Gaurav Sears MD  April 20, 2018  11:50 AM

## 2018-04-20 NOTE — ANESTHESIA CARE TRANSFER NOTE
Patient: Natasha Woodruff    Procedure(s):  Debridement Perineal Sinus Tract, Fistulotomy with seton placement - Wound Class: II-Clean Contaminated    Diagnosis: Chronic Perineal Sinus Tract  Diagnosis Additional Information: No value filed.    Anesthesia Type:   General, LMA     Note:  Airway :Nasal Cannula  Patient transferred to:PACU  Comments: VSS and WNL, comfortable, no PONV, report to Riley RNHandoff Report: Identifed the Patient, Identified the Reponsible Provider, Reviewed the pertinent medical history, Discussed the surgical course, Reviewed Intra-OP anesthesia mangement and issues during anesthesia, Set expectations for post-procedure period and Allowed opportunity for questions and acknowledgement of understanding      Vitals: (Last set prior to Anesthesia Care Transfer)    CRNA VITALS  4/20/2018 1027 - 4/20/2018 1101      4/20/2018             Pulse: 85    SpO2: 98 %    Resp Rate (observed): 18                Electronically Signed By: JUANJOSE Roca CRNA  April 20, 2018  11:01 AM

## 2018-04-20 NOTE — DISCHARGE INSTRUCTIONS
"[facility]  Home Care Following Anesthesia  For 24 hours after surgery:  1. Get plenty of rest.  A responsible adult must stay with you for at least 24 hours after you leave the surgery center.  2. Do not drive or use heavy equipment.  If you have weakness or tingling, don't drive or use heavy equipment until this feeling goes away.   3. Do not drink alcohol.   4. Avoid strenuous or risky activities.  Ask for help when climbing stairs.  5. You may feel lightheaded.  IF so, sit for a few minutes before standing.  Have someone help you get up.   6. If you have nausea (feel sick to your stomach): Drink only clear liquids such as apple juice, ginger ale, broth or 7-Up.  Rest may also help.  Be sure to drink enough fluids.  Move to a regular diet as you feel able.   7. You may have a slight fever.  Call the doctor if your fever is over 100 F (37.7 C) (taken under the tongue) or lasts longer than 24 hours.  8. You may have a dry mouth, a sore throat, muscle aches or trouble sleeping. These should go away after 24 hours.  9. Do not make important or legal decisions.        Today you received a Marcaine or bupivacaine block to numb the nerves near your surgery site.  This is a block using local anesthetic or \"numbing\" medication injected around the nerves to anesthetize or \"numb\" the area supplied by those nerves.  This block is injected into the muscle layer near your surgical site.  The medication may numb the location where you had surgery for 6-18 hours, but may last up to 24 hours.  If your surgical site is an arm or leg you should be careful with your affected limb, since it is possible to injure your limb without being aware of it due to the numbing.  Until full feeling returns, you should guard against bumping or hitting your limb, and avoid extreme hot or cold temperatures on the skin.  As the block wears off, the feeling will return as a tingling or prickly sensation near your " surgical site.  You will experience more discomfort from your incision as the feeling returns.  You may want to take a pain pill (a narcotic or Tylenol if this was prescribed by your surgeon) when you start to experience mild pain before the pain beccomes more severe.  If your pain medications do not control your pain you should notifiy your surgeon.    Tips for taking pain medications  To get the best pain relief possible, remember these points:    Take pain medications as directed, before pain becomes severe.    Pain medication can upset your stomach: taking it with food may help.    Constipation is a common side effect of pain medication. Drink plenty of  fluids.    Eat foods high in fiber. Take a stool softener if recommended by your doctor or pharmacist.    Do not drink alcohol, drive or operate machinery while taking pain medications.    Ask about other ways to control pain, such as with heat, ice or relaxation.    Tylenol/Acetaminophen Consumption  To help encourage the safe use of acetaminophen, the makers of TYLENOL  have lowered the maximum daily dose for single-ingredient Extra Strength TYLENOL  (acetaminophen) products sold in the [place] from 8 pills per day (4,000 mg) to 6 pills per day (3,000 mg). The dosing interval has also changed from 2 pills every 4-6 hours to 2 pills every 6 hours.    If you feel your pain relief is insufficient, you may take Tylenol/Acetaminophen in addition to your narcotic pain medication.     Be careful not to exceed 3,000 mg of Tylenol/Acetaminophen in a 24 hour period from all sources.    If you are taking extra strength Tylenol/acetaminophen (500 mg), the maximum dose is 6 tablets in 24 hours.    If you are taking regular strength acetaminophen (325 mg), the maximum dose is 9 tablets in 24 hours.    Call a doctor for any of the followin. Signs of infection (fever, growing tenderness at the surgery site, a large amount of drainage or bleeding, severe pain, foul-smelling  drainage, redness, swelling).  2. It has been over 8 to 10 hours since surgery and you are still not able to urinate (pass water).  3. Headache for over 24 hours.  Your doctor is:  Dr. Aaliyah De La Fuente, Plastic Surgery: [phone]                 Or dial [phone] and ask for the resident on call for:  Plastics  For emergency care, call the:  [facility]:  [phone] (TTY for hearing impaired: [phone])

## 2018-04-20 NOTE — IP AVS SNAPSHOT
[facility]    [address] [place] [id number]    Phone:  [phone]    Fax:  [phone]                                       After Visit Summary   4/20/2018    Natasha Woodruff    MRN: [id number]           After Visit Summary Signature Page     I have received my discharge instructions, and my questions have been answered. I have discussed any challenges I see with this plan with the nurse or doctor.    ..........................................................................................................................................  Patient/Patient Representative Signature      ..........................................................................................................................................  Patient Representative Print Name and Relationship to Patient    ..................................................               ................................................  Date                                            Time    ..........................................................................................................................................  Reviewed by Signature/Title    ...................................................              ..............................................  Date                                                            Time

## 2018-04-21 NOTE — OP NOTE
Procedure Date: 04/20/2018      ATTENDING SURGEON:  Aaliyah De La Fuente MD      COSURGEON:  Rafat Mccallum MD      PREOPERATIVE DIAGNOSIS:  Chronic recurrent right perineal sinus tract.      POSTOPERATIVE DIAGNOSIS:  Anal fistula.      PROCEDURES:   1.  Subcutaneous fistulectomy.   2.  Anal fistula exploration and Seton placement by Dr. Mccallum.      ANESTHESIA:  G-LMA.      ESTIMATED BLOOD LOSS:  5 mL.      COUNTS:  Correct.      COMPLICATIONS:  None.      DRAINS:  None.      INDICATIONS:  This is a 37-year-old female who has been struggling with a recurrent persistent right perineal wound that essentially could be described as a sinus tract.  She has had a number of procedures on this area but has become increasingly frustrated because it continues to recur.  It has caused a lot of pain and problems with drainage.  We saw her in clinic a couple of times to try and help with wound healing, but since it has recurred, we agreed to bring her back to the operating room and debride this area to see if we could determine the reason for its recurrence.  Our theory was perhaps some retained suture material or something at the base of the tract.      DESCRIPTION OF PROCEDURE:  The patient was seen in the preoperative waiting area.  The operative site was marked on the anatomical diagram.  Informed consent was obtained after reviewing possible risks and complications including but not limited to the following:  Infection, bleeding, hematoma, seroma formation, poor healing, residual deformities, need for further surgery, possible injury to the neurovascular and musculoskeletal structures as well as gynecologic and colorectal structures adjacent.  Anesthetic risks such as DVT, PE and cardiopulmonary arrest.  The patient was then brought to the operating room and placed supine on the OR table.  After general anesthesia was administered and the patient had an LMA placed, she was put into [facility].  The perineal area  was exposed, and her Band-Aid was removed.  This perineal area was then prepped and draped in the usual sterile fashion using PCMX essentially.  After timeout was taken and the proper patient and procedure were identified, we tried to probe the opening of the wound with a 20-gauge Angiocath to inject methylene blue.  This was not sturdy enough, so a larger Angiocath was used.  While this did not appear to go very far, it did at least enter perhaps a cm or 2.  A decision was made to do kind of a wide elliptical excision of this obvious scar area.  One could palpate in the deeper tissue a kind of linear scar tract.  As we were using the #15 blade to kind of excise the subq fat around this tract, the probe that we were now using was able to pass deeper into what might have been a partially scarred tract.  With this advancement of the probe, we were able to inject more methylene blue.  At this time, however, we noted that blue dye was now coming out of the anus.  This indicated that most likely this patient had an anal fistula that had been undiagnosed.  The portion of the sinus tract had already been resected to try and facilitate better tracking of this scar tract.  When we realized that this was involving the anus, we were fortunate enough to have Dr. Rafat Mccallum from the Colorectal [facility] come into the OR and assist us.  Please see his dictation for further details.  Essentially, we asked for some milk and were finally able to show where the actual opening within the anus was escaping.  Eventually we were able to get probes through the entire defect and pass a heavy Prolene suture and then ultimately a Seton.  The I&D wound was then irrigated with triple antibiotic solution.  We did not use Pulsavac.  We then got hemostasis with the electrocautery.  I then injected 10 mL of 0.5% Marcaine with epinephrine as a field block.  Once we were sure she was no longer bleeding, we used a 1-inch iodoform packing gauze to  pack the wound defect.  This was then covered with fluff gauze, and mesh panties were placed to keep them in position.  The patient was taken out of [place] and then extubated.  She was transferred to a stretcher and taken to the recovery room in satisfactory condition, having tolerated the procedure without difficulty or complication.  Essentially her final wound measurements were approximately 2.5 x 2 cm with a depth of the least 2 cm.  No specimens were sent.         ROLO VELASCO MD             D: 2018   T: 2018   MT: ERMELINDA      Name:     MARIA DEL ROSARIO JUSTIN   MRN:      [id number]        Account:        [id number]   :      1980           Procedure Date: 2018      Document: [id number]

## 2018-04-23 NOTE — OP NOTE
Procedure Date: 04/20/2018      SURGEON:  Rafat Mccallum MD      This is an intraoperative consultation and operative note.  I was asked by Dr. De La Fuente to scrub in on her ongoing operation on this 37-year-old woman with a refractory anterior perineal sinus that had undergone multiple debridements.  Dr. De La Fuente had been following a chronic sinus tract and with injection of methylene blue was noted to emerge from the anal canal.  There was already a roughly 2 cm opening left anterolateral relative to the anal canal.  We were able to fairly easily identify the fistula tract within this opening and pass a fistula probe proximally.  There seemed to be a cavity that tracked along the rectal wall in the left anterolateral position.  On anoscopy, the anal canal was stained blue, but there was no clearly identifiable internal fistula opening.  There was a small hypertrophied papilla just to the left of the midline anteriorly.  We initially attempted to reinject cream down the tract, and nothing emerged.  No internal opening was identified.  Using a crypt hook, I was unable to identify an internal opening.  With further manipulation, the probe was able to be passed through an internal opening that lay in the anterior midline, medial to the small hypertrophied papilla.  Once this tract was identified, we passed a yellow Silastic vessel loop seton across the tract and tied it in a loose configuration with 4 interrupted 2-0 silks.      We plan that the patient will follow up in Colorectal Surgery Clinic in the next couple of weeks.  She can pack her external wound.  Once everything has scarred down, I will plan to get an MRI to look for side tracts.  This looks like a high fistula that will probably be best addressed with a LIFT procedure.      Dr. De La Fuente will dictate all of the other details of the case.         RAFAT MCCALLUM MD             D: 04/20/2018   T: 04/20/2018   MT: lauro      Name:     MARIA DEL ROSARIO JUSTIN   MRN:       [id number]        Account:        [id number]   :      1980           Procedure Date: 2018      Document: [id number]       cc: Aaliyah De La Fuente MD       [facility] Billing

## 2018-04-23 NOTE — PROVIDER NOTIFICATION
Patient unable to contact  at Dr. Mccallum's office for a follow-up appointment.  Instructed patient to call Dr. De La Fuente's office.  Patient also to call clinic for more clarity on how long she should pack her would and how to obtain more dressing supplies.  Pain well-controlled.  No s/s of infection.

## 2018-04-27 ENCOUNTER — TELEPHONE (OUTPATIENT)
Dept: SURGERY | Facility: CLINIC | Age: 38
End: 2018-04-27

## 2018-04-27 ENCOUNTER — HOSPITAL ENCOUNTER (EMERGENCY)
Facility: CLINIC | Age: 38
Discharge: HOME | End: 2018-04-27
Attending: INTERNAL MEDICINE | Admitting: INTERNAL MEDICINE
Payer: COMMERCIAL

## 2018-04-27 VITALS
HEIGHT: 66 IN | RESPIRATION RATE: 18 BRPM | HEART RATE: 86 BPM | OXYGEN SATURATION: 98 % | SYSTOLIC BLOOD PRESSURE: 123 MMHG | BODY MASS INDEX: 27.32 KG/M2 | DIASTOLIC BLOOD PRESSURE: 65 MMHG | WEIGHT: 170 LBS | TEMPERATURE: 98.5 F

## 2018-04-27 DIAGNOSIS — G89.18 ACUTE POST-OPERATIVE PAIN: ICD-10-CM

## 2018-04-27 LAB
ALBUMIN SERPL-MCNC: 3.6 G/DL (ref 3.4–5)
ALBUMIN UR-MCNC: NEGATIVE MG/DL
ALP SERPL-CCNC: 73 U/L (ref 40–150)
ALT SERPL W P-5'-P-CCNC: 15 U/L (ref 0–50)
ANION GAP SERPL CALCULATED.3IONS-SCNC: 6 MMOL/L (ref 3–14)
APPEARANCE UR: CLEAR
AST SERPL W P-5'-P-CCNC: 16 U/L (ref 0–45)
BACTERIA #/AREA URNS HPF: ABNORMAL /HPF
BASOPHILS # BLD AUTO: 0.1 10E9/L (ref 0–0.2)
BASOPHILS NFR BLD AUTO: 0.6 %
BILIRUB SERPL-MCNC: 0.2 MG/DL (ref 0.2–1.3)
BILIRUB UR QL STRIP: NEGATIVE
BUN SERPL-MCNC: 10 MG/DL (ref 7–30)
CALCIUM SERPL-MCNC: 8.5 MG/DL (ref 8.5–10.1)
CHLORIDE SERPL-SCNC: 106 MMOL/L (ref 94–109)
CO2 SERPL-SCNC: 24 MMOL/L (ref 20–32)
COLOR UR AUTO: ABNORMAL
CREAT SERPL-MCNC: 0.81 MG/DL (ref 0.52–1.04)
CRP SERPL-MCNC: 14 MG/L (ref 0–8)
DIFFERENTIAL METHOD BLD: NORMAL
EOSINOPHIL # BLD AUTO: 0.7 10E9/L (ref 0–0.7)
EOSINOPHIL NFR BLD AUTO: 6.9 %
ERYTHROCYTE [DISTWIDTH] IN BLOOD BY AUTOMATED COUNT: 12.8 % (ref 10–15)
ERYTHROCYTE [SEDIMENTATION RATE] IN BLOOD BY WESTERGREN METHOD: 10 MM/H (ref 0–20)
GFR SERPL CREATININE-BSD FRML MDRD: 80 ML/MIN/1.7M2
GLUCOSE SERPL-MCNC: 101 MG/DL (ref 70–99)
GLUCOSE UR STRIP-MCNC: NEGATIVE MG/DL
HCG SERPL QL: NEGATIVE
HCT VFR BLD AUTO: 40.1 % (ref 35–47)
HGB BLD-MCNC: 13.1 G/DL (ref 11.7–15.7)
HGB UR QL STRIP: NEGATIVE
IMM GRANULOCYTES # BLD: 0 10E9/L (ref 0–0.4)
IMM GRANULOCYTES NFR BLD: 0.1 %
INR PPP: 1.01 (ref 0.86–1.14)
KETONES UR STRIP-MCNC: NEGATIVE MG/DL
LEUKOCYTE ESTERASE UR QL STRIP: NEGATIVE
LYMPHOCYTES # BLD AUTO: 1.9 10E9/L (ref 0.8–5.3)
LYMPHOCYTES NFR BLD AUTO: 18.3 %
MCH RBC QN AUTO: 30.1 PG (ref 26.5–33)
MCHC RBC AUTO-ENTMCNC: 32.7 G/DL (ref 31.5–36.5)
MCV RBC AUTO: 92 FL (ref 78–100)
MONOCYTES # BLD AUTO: 0.4 10E9/L (ref 0–1.3)
MONOCYTES NFR BLD AUTO: 4.1 %
NEUTROPHILS # BLD AUTO: 7.2 10E9/L (ref 1.6–8.3)
NEUTROPHILS NFR BLD AUTO: 70 %
NITRATE UR QL: NEGATIVE
NRBC # BLD AUTO: 0 10*3/UL
NRBC BLD AUTO-RTO: 0 /100
PH UR STRIP: 6.5 PH (ref 5–7)
PLATELET # BLD AUTO: 287 10E9/L (ref 150–450)
POTASSIUM SERPL-SCNC: 3.9 MMOL/L (ref 3.4–5.3)
PROT SERPL-MCNC: 7.3 G/DL (ref 6.8–8.8)
RBC # BLD AUTO: 4.35 10E12/L (ref 3.8–5.2)
RBC #/AREA URNS AUTO: <1 /HPF (ref 0–2)
SODIUM SERPL-SCNC: 137 MMOL/L (ref 133–144)
SOURCE: ABNORMAL
SP GR UR STRIP: 1.01 (ref 1–1.03)
SQUAMOUS #/AREA URNS AUTO: 1 /HPF (ref 0–1)
TRANS CELLS #/AREA URNS HPF: <1 /HPF (ref 0–1)
UROBILINOGEN UR STRIP-MCNC: NORMAL MG/DL (ref 0–2)
WBC # BLD AUTO: 10.3 10E9/L (ref 4–11)
WBC #/AREA URNS AUTO: 1 /HPF (ref 0–5)

## 2018-04-27 PROCEDURE — 96374 THER/PROPH/DIAG INJ IV PUSH: CPT | Performed by: INTERNAL MEDICINE

## 2018-04-27 PROCEDURE — 81001 URINALYSIS AUTO W/SCOPE: CPT | Performed by: INTERNAL MEDICINE

## 2018-04-27 PROCEDURE — 99285 EMERGENCY DEPT VISIT HI MDM: CPT | Mod: 25 | Performed by: INTERNAL MEDICINE

## 2018-04-27 PROCEDURE — 85025 COMPLETE CBC W/AUTO DIFF WBC: CPT | Performed by: INTERNAL MEDICINE

## 2018-04-27 PROCEDURE — 96361 HYDRATE IV INFUSION ADD-ON: CPT | Performed by: INTERNAL MEDICINE

## 2018-04-27 PROCEDURE — 96376 TX/PRO/DX INJ SAME DRUG ADON: CPT | Performed by: INTERNAL MEDICINE

## 2018-04-27 PROCEDURE — 99284 EMERGENCY DEPT VISIT MOD MDM: CPT | Mod: Z6 | Performed by: INTERNAL MEDICINE

## 2018-04-27 PROCEDURE — 86140 C-REACTIVE PROTEIN: CPT | Performed by: INTERNAL MEDICINE

## 2018-04-27 PROCEDURE — 85652 RBC SED RATE AUTOMATED: CPT | Performed by: INTERNAL MEDICINE

## 2018-04-27 PROCEDURE — 85610 PROTHROMBIN TIME: CPT | Performed by: INTERNAL MEDICINE

## 2018-04-27 PROCEDURE — 96375 TX/PRO/DX INJ NEW DRUG ADDON: CPT | Performed by: INTERNAL MEDICINE

## 2018-04-27 PROCEDURE — 84703 CHORIONIC GONADOTROPIN ASSAY: CPT | Performed by: INTERNAL MEDICINE

## 2018-04-27 PROCEDURE — 25000128 H RX IP 250 OP 636: Performed by: INTERNAL MEDICINE

## 2018-04-27 PROCEDURE — 80053 COMPREHEN METABOLIC PANEL: CPT | Performed by: INTERNAL MEDICINE

## 2018-04-27 RX ORDER — HYDROMORPHONE HYDROCHLORIDE 1 MG/ML
0.5 INJECTION, SOLUTION INTRAMUSCULAR; INTRAVENOUS; SUBCUTANEOUS ONCE
Status: COMPLETED | OUTPATIENT
Start: 2018-04-27 | End: 2018-04-27

## 2018-04-27 RX ORDER — TRAMADOL HYDROCHLORIDE 50 MG/1
50 TABLET ORAL EVERY 6 HOURS PRN
Qty: 20 TABLET | Refills: 0 | Status: SHIPPED | OUTPATIENT
Start: 2018-04-27

## 2018-04-27 RX ORDER — NAPROXEN 500 MG/1
500 TABLET ORAL 2 TIMES DAILY PRN
Qty: 30 TABLET | Refills: 0 | Status: SHIPPED | OUTPATIENT
Start: 2018-04-27

## 2018-04-27 RX ORDER — PROMETHAZINE HYDROCHLORIDE 25 MG/ML
12.5 INJECTION, SOLUTION INTRAMUSCULAR; INTRAVENOUS ONCE
Status: COMPLETED | OUTPATIENT
Start: 2018-04-27 | End: 2018-04-27

## 2018-04-27 RX ADMIN — HYDROMORPHONE HYDROCHLORIDE 0.5 MG: 1 INJECTION, SOLUTION INTRAMUSCULAR; INTRAVENOUS; SUBCUTANEOUS at 11:02

## 2018-04-27 RX ADMIN — SODIUM CHLORIDE 1000 ML: 9 INJECTION, SOLUTION INTRAVENOUS at 11:02

## 2018-04-27 RX ADMIN — HYDROMORPHONE HYDROCHLORIDE 0.5 MG: 1 INJECTION, SOLUTION INTRAMUSCULAR; INTRAVENOUS; SUBCUTANEOUS at 12:39

## 2018-04-27 RX ADMIN — PROMETHAZINE HYDROCHLORIDE 12.5 MG: 25 INJECTION INTRAMUSCULAR; INTRAVENOUS at 10:58

## 2018-04-27 ASSESSMENT — ENCOUNTER SYMPTOMS
FEVER: 0
HEADACHES: 1

## 2018-04-27 NOTE — TELEPHONE ENCOUNTER
Spoke with pt regarding scheduling post op appt. Explained to pt that she has been given appt the same day, 5/1/18,  as her appt with Dr. De La Fuente at 4:45pm with MELIDA Gallagher. Pt confirms date, time, and location. Smita HAYNES LPN

## 2018-04-27 NOTE — ED PROVIDER NOTES
History     Chief Complaint   Patient presents with     Post-op Problem     Headache     Nausea     HPI  Natasha Woodruff is a 37 year old female with a history of vertigo and a ganglion cyst of the right groin, who is status-post irrigation and debridement perineal on 4/20/2018 (7 days ago). She presents to the Emergency Department for evaluation of post-operative pain. Patient reports that she began having sharp pain near her groin and an intense headache that began yesterday (4/26). She also states that she developed hives as well and noted that she has never experienced this before. She took Imitrex for pain management without relief of symptoms. Patient denies fevers.     No current facility-administered medications for this encounter.      Current Outpatient Prescriptions   Medication     naproxen (NAPROSYN) 500 MG tablet     oxyCODONE IR (ROXICODONE) 5 MG tablet     traMADol (ULTRAM) 50 MG tablet     Azelastine HCl 0.15 % SOLN     fexofenadine-pseudoePHEDrine (ALLEGRA-D 24) 180-240 MG per 24 hr tablet     levalbuterol (XOPENEX HFA) 45 MCG/ACT Inhaler     methylphenidate (METADATE ER) 20 MG CR tablet     norgestimate-ethinyl estradiol (ORTHO-CYCLEN, SPRINTEC) 0.25-35 MG-MCG per tablet     SUMAtriptan (IMITREX) 100 MG tablet     Past Medical History:   Diagnosis Date     Ganglion cyst of right groin      Vertigo        Past Surgical History:   Procedure Laterality Date     CYSTECTOMY PILONIDAL       ENT SURGERY      tonsillectomy, bleeding afterwards     IRRIGATION AND DEBRIDEMENT PERINEAL, COMBINED N/A 4/20/2018    Procedure: COMBINED IRRIGATION AND DEBRIDEMENT PERINEAL;  Debridement Perineal Sinus Tract, Fistulotomy with seton placement;  Surgeon: Aaliyah De La Fuente MD;  Location: [facility] OR       No family history on file.    Social History   Substance Use Topics     Smoking status: Former Smoker     Types: Cigarettes     Smokeless tobacco: Never Used      Comment: 5 years ago     Alcohol use Yes       "Comment: 1-2 glasses wine daily     Allergies   Allergen Reactions     Liquid Adhesive Rash     Sulfamethoxazole-Trimethoprim Rash     Sulfasalazine Rash     I have reviewed the Medications, Allergies, Past Medical and Surgical History, and Social History in the Epic system.    Review of Systems   Constitutional: Negative for fever.   Genitourinary: Positive for pelvic pain.   Skin: Positive for rash (hives).   Neurological: Positive for headaches.   All other systems reviewed and are negative.      Physical Exam   BP: 131/68  Pulse: 86  Temp: 98.5  F (36.9  C)  Resp: 18  Height: 167.6 cm (5' 6\")  Weight: 77.1 kg (170 lb)  SpO2: 99 %      Physical Exam   Constitutional: She is oriented to person, place, and time. No distress.   HENT:   Head: Atraumatic.   Mouth/Throat: Oropharynx is clear and moist. No oropharyngeal exudate.   Eyes: Pupils are equal, round, and reactive to light. No scleral icterus.   Neck: Neck supple. No JVD present.   Cardiovascular: Normal rate, normal heart sounds and intact distal pulses.  Exam reveals no gallop and no friction rub.    No murmur heard.  Pulmonary/Chest: Effort normal and breath sounds normal. No respiratory distress. She has no wheezes. She has no rales. She exhibits no tenderness.   Abdominal: Soft. Bowel sounds are normal. She exhibits no distension and no mass. There is no tenderness. There is no rebound and no guarding.   Genitourinary:         Musculoskeletal: She exhibits no edema or tenderness.   Neurological: She is alert and oriented to person, place, and time. No cranial nerve deficit. Coordination normal.   Skin: Skin is warm. No rash noted. She is not diaphoretic.       ED Course   10:33 AM  The patient was seen and examined by Arabella Pandey MD in Room 6.   ED Course     Procedures        Results for orders placed or performed during the hospital encounter of 04/27/18 (from the past 24 hour(s))   CBC with platelets differential     Status: None    Collection Time: " 04/27/18 10:53 AM   Result Value Ref Range    WBC 10.3 4.0 - 11.0 10e9/L    RBC Count 4.35 3.8 - 5.2 10e12/L    Hemoglobin 13.1 11.7 - 15.7 g/dL    Hematocrit 40.1 35.0 - 47.0 %    MCV 92 78 - 100 fl    MCH 30.1 26.5 - 33.0 pg    MCHC 32.7 31.5 - 36.5 g/dL    RDW 12.8 10.0 - 15.0 %    Platelet Count 287 150 - 450 10e9/L    Diff Method Automated Method     % Neutrophils 70.0 %    % Lymphocytes 18.3 %    % Monocytes 4.1 %    % Eosinophils 6.9 %    % Basophils 0.6 %    % Immature Granulocytes 0.1 %    Nucleated RBCs 0 0 /100    Absolute Neutrophil 7.2 1.6 - 8.3 10e9/L    Absolute Lymphocytes 1.9 0.8 - 5.3 10e9/L    Absolute Monocytes 0.4 0.0 - 1.3 10e9/L    Absolute Eosinophils 0.7 0.0 - 0.7 10e9/L    Absolute Basophils 0.1 0.0 - 0.2 10e9/L    Abs Immature Granulocytes 0.0 0 - 0.4 10e9/L    Absolute Nucleated RBC 0.0    INR     Status: None    Collection Time: 04/27/18 10:53 AM   Result Value Ref Range    INR 1.01 0.86 - 1.14   Comprehensive metabolic panel     Status: Abnormal    Collection Time: 04/27/18 10:53 AM   Result Value Ref Range    Sodium 137 133 - 144 mmol/L    Potassium 3.9 3.4 - 5.3 mmol/L    Chloride 106 94 - 109 mmol/L    Carbon Dioxide 24 20 - 32 mmol/L    Anion Gap 6 3 - 14 mmol/L    Glucose 101 (H) 70 - 99 mg/dL    Urea Nitrogen 10 7 - 30 mg/dL    Creatinine 0.81 0.52 - 1.04 mg/dL    GFR Estimate 80 >60 mL/min/1.7m2    GFR Estimate If Black >90 >60 mL/min/1.7m2    Calcium 8.5 8.5 - 10.1 mg/dL    Bilirubin Total 0.2 0.2 - 1.3 mg/dL    Albumin 3.6 3.4 - 5.0 g/dL    Protein Total 7.3 6.8 - 8.8 g/dL    Alkaline Phosphatase 73 40 - 150 U/L    ALT 15 0 - 50 U/L    AST 16 0 - 45 U/L   CRP inflammation     Status: Abnormal    Collection Time: 04/27/18 10:53 AM   Result Value Ref Range    CRP Inflammation 14.0 (H) 0.0 - 8.0 mg/L   Erythrocyte sedimentation rate auto     Status: None    Collection Time: 04/27/18 10:53 AM   Result Value Ref Range    Sed Rate 10 0 - 20 mm/h   HCG qualitative pregnancy (blood)      Status: None    Collection Time: 04/27/18 10:53 AM   Result Value Ref Range    HCG Qualitative Serum Negative NEG^Negative   UA with Microscopic     Status: Abnormal    Collection Time: 04/27/18 12:23 PM   Result Value Ref Range    Color Urine Light Yellow     Appearance Urine Clear     Glucose Urine Negative NEG^Negative mg/dL    Bilirubin Urine Negative NEG^Negative    Ketones Urine Negative NEG^Negative mg/dL    Specific Gravity Urine 1.006 1.003 - 1.035    Blood Urine Negative NEG^Negative    pH Urine 6.5 5.0 - 7.0 pH    Protein Albumin Urine Negative NEG^Negative mg/dL    Urobilinogen mg/dL Normal 0.0 - 2.0 mg/dL    Nitrite Urine Negative NEG^Negative    Leukocyte Esterase Urine Negative NEG^Negative    Source Urine     WBC Urine 1 0 - 5 /HPF    RBC Urine <1 0 - 2 /HPF    Bacteria Urine Moderate (A) NEG^Negative /HPF    Squamous Epithelial /HPF Urine 1 0 - 1 /HPF    Transitional Epi <1 0 - 1 /HPF           Labs Ordered and Resulted from Time of ED Arrival Up to the Time of Departure from the ED   COMPREHENSIVE METABOLIC PANEL - Abnormal; Notable for the following:        Result Value    Glucose 101 (*)     All other components within normal limits   CRP INFLAMMATION - Abnormal; Notable for the following:     CRP Inflammation 14.0 (*)     All other components within normal limits   ROUTINE UA WITH MICROSCOPIC - Abnormal; Notable for the following:     Bacteria Urine Moderate (*)     All other components within normal limits   CBC WITH PLATELETS DIFFERENTIAL   INR   ERYTHROCYTE SEDIMENTATION RATE AUTO   HCG QUALITATIVE            Assessments & Plan (with Medical Decision Making)  Acute post ope pain s/p sinus tract at perineal area per Dr Mccallum and Catherine, labs ok, Colorectal consult done-went over the instruction, sitz bath, local care, will DC with naproxen and tramadol prn, follow up with Colorectal Surg.       I have reviewed the nursing notes.    I have reviewed the findings, diagnosis, plan and need for  follow up with the patient.    Discharge Medication List as of 4/27/2018  2:35 PM      START taking these medications    Details   naproxen (NAPROSYN) 500 MG tablet Take 1 tablet (500 mg) by mouth 2 times daily as needed for moderate pain, Disp-30 tablet, R-0, Local Print      traMADol (ULTRAM) 50 MG tablet Take 1 tablet (50 mg) by mouth every 6 hours as needed for severe pain, Disp-20 tablet, R-0, Local Print             Final diagnoses:   Acute post-operative pain   Gisselle DONALDSON, am serving as a trained medical scribe to document services personally performed by Arabella Pandey MD, based on the provider's statements to me.   Arabella DONALDSON MD, was physically present and have reviewed and verified the accuracy of this note documented by Gisselle Manning.    4/27/2018   [facility], [place], EMERGENCY DEPARTMENT     Arabella Pandey MD  04/27/18 [id number]

## 2018-04-27 NOTE — DISCHARGE INSTRUCTIONS
Please make an appointment to follow up with [facility] (phone: [phone]) as soon as possible even if entirely better.

## 2018-04-27 NOTE — ED AVS SNAPSHOT
[facility], Emergency Department    [address] [address]    Phone:  [phone]                                       Natasha Woodruff   MRN: [id number]    Department:  [facility], Emergency Department   Date of Visit:  4/27/2018           Patient Information     Date Of Birth          1980        Your diagnoses for this visit were:     Acute post-operative pain        You were seen by Arabella Pandey MD.        Discharge Instructions       Please make an appointment to follow up with [facility] (phone: [phone]) as soon as possible even if entirely better.     Discharge References/Attachments     PAIN, MEDICINE FOR (ENGLISH)      Your next 10 appointments already scheduled     May 01, 2018  4:30 PM CDT   (Arrive by 4:15 PM)   Post-Op with Aaliyah De La Fuente MD   [facility] Plastic and Reconstructive Surgery ([facility])    [address] [place] [id number] [phone]            May 01, 2018  4:45 PM CDT   (Arrive by 4:30 PM)   Post-Op with JUANJOSE Moody [facility] Colon and Rectal Surgery ([facility])    [address] [place] [id number] [phone]              24 Hour Appointment Hotline       To make an appointment at any [facility], call [phone] ([phone]). If you don't have a family doctor or clinic, we will help you find one. [facility] are conveniently located to serve the needs of you and your family.          ED Discharge Orders     Adult [facility] [facility] [employer] Follow-up and recommended labs and tests       COLON AND RECTAL SURGERY DISCHARGE RECOMMENDATIONS:   Anorectal Surgery Instructions    What can I expect after anorectal surgery?  Most anorectal procedures are done as outpatient surgery, and you go home the same day as the procedure. A few surgical procedures will require that you stay in the hospital for  about one to three days. No matter where the procedure is done or how long or short it takes, these recommendations will help you heal and feel more comfortable.    Medicines:  The anal area is very sensitive; you can expect to have some pain for up to 2-4 weeks after the procedure. Your doctor will give you a prescription for one or more pain medications.    Take naprosyn 500 mg twice a day OR ibuprofen 600 mg four times a day   Take this on a regular basis (not as needed) following your surgery.   The drugs are best taken with food.  Do not take if it causes stomach upset or if you have a history of ulcers or gastritis. You can stop the naprosyn (or ibuprofen) or reduce the dose when you are feeling better.  DO NOT use naprosyn, ibuprofen, or other similar agents (eg. Advil or Aleve) if you have inflammatory bowel disease (Ulcerative Colitis or Crohn's disease) or if your doctor as advised you against using these medications    Take acetominaphen (Tylenol) 650-1000 mg four times a day.   Take this on a regular basis (not as needed) following surgery for pain control.    Take the lower dose if you are >65 years old or have liver disease. The maximum dose of acetominaphen is 4000 mg a day. You can stop the acetaminophen or reduce the dose when you are feeling better.  It is important to realize that many narcotic pain relievers (including vicodin, percocet, tylenol #3) also have acetaminophen, and excessive doses of acetaminophen can be dangerous, so do not take these in addition to acetominaphen.  You may take narcotics that don't contain acetominaphen such as oxycodone.      Take oxycodone AS NEEDED in addition to the acetominaphen and naprosyn.    Because narcotics have side effects (including constipation), you should reduce your use of these medications as tolerated as your pain improves.    *In general, the best strategy is to take (if you are able to tolerate it) the tylenol and naprosen on a regular basis  until your pain has largely gone away. You can take the narcotic pain medicine as needed in addition to the tylenol and ibuprofen. As your pain begins to lessen, you should cut back on your narcotic use while continuing to take your regular tylenol and naprosyn doses.    Refilling prescriptions. If you need additional pain medication, please call the triage nurse at [phone] during normal business hours (8 a.m. to 4 p.m., Monday though Friday) or have your pharmacy fax a refill request to [phone]. If you call after hours or on the weekends, the doctor on call may not know you personally and may not renew narcotic pain medication by phone. Call your primary care provider for all other medication refills.    Perineal care:  Tub baths:  If possible, take a tub bath immediately after each bowel movement.   Baths should be take at least 3 times daily for the first week to 10 days following your procedure. You should soak in the tub for 10 to 15 minutes each time with water as warm as you can tolerate.   Even after you go back to work, it is a good idea to sit in the tub in the morning, after returning from work, and again in the evening before bedtime.    Bleeding/Infection:  You can expect to have some bleeding after bowel movements, but it should stop soon after you wipe.   Use a wet cloth or perianal pad (Tucks or Preparation H pads) to gently wipe the area after each bowel movement.  Do not rub the anal area or use a lot of pressure.  Using a spray bottle filled with warm water helps loosen any remaining stool. Blot gently with a soft dry cloth or tissue paper.  Infection around the anal opening is not very common. The anal area has excellent blood supply, which helps the area to heal. Bloody discharge after bowel movements is normal and may last 2 to 4 weeks after your surgery. However, if you bleed between bowel movements and cannot get it to stop, call the triage nurse immediately [phone].    Bowel  function:  Take a fiber supplement such as Metamucil, which is over the counter. It is important to drink six to eight glasses of water or juice everyday when using fiber products.    If you do not have a bowel movement after 1-2 days:  Take Milk of Magnesia-2 tablespoons.     If there are no results, repeat this or add over the counter Miralax.    If you still do not have results, contact the clinic.   If there are no results, repeat this. Stop taking Milk of Magnesia or other laxatives if you begin to have diarrhea.    * Constipation will cause you to strain when you have a bowel movement. The hard stool will be difficult to pass, will increase pain and bleeding, and will slow down healing.  Try to avoid constipation and/or diarrhea as this can make the pain and bleeding worse.    * It is important to have regular bowel movements at least every other day and to keep your stool soft.  A high fiber diet, including at least four servings of fruits or vegetables daily, will help to keep your bowel movements regular and soft.    Activity:  After your procedure, there are no restrictions on your activity   except restrictions surrounding being on narcotics and in pain, such as no heavy machine operating or driving.   You may walk, climb stairs, ride in a car, and sit as tolerated.   It is helpful to avoid sitting in one position for long periods (2 or more hours).  After some surgeries, you may be told not to perform any lifting (more than 10 pounds) for several weeks after surgery.    When to call:  When do I need to call the doctor or triage nurse?  If you experience any of the problems listed here, call our triage nurse during business hours ([phone]).   The nurse will help you with your problem or have the doctor call you.   After hours and on weekends, please call the main hospital number ([phone]) and ask for the colon and rectal surgery person on call.   Some is available to help you 24 hours a day,  seven days a week.  Call for:   ? Fever greater than 101 degrees   ? Chills   ? Foul-smelling drainage   ? Nausea and vomiting   ? Diarrhea - greater than 3 water stools in 24 hours   ? Constipation - no bowel movement after 3 days   ? Severe bleeding that does not stop soon after a bowel movement   ? Problems with the incision, including increased pain, swelling, or redness      NOTIFY  Please contact Darlin Carmen LPN or Lotus Goss RN or Mary Gipson RN at [phone] for problems after discharge such as:  -Temperature > 101F, chills, rigors, dizziness  -New redness around or purulent drainage from wound  -Inability to tolerate diet, nausea or vomiting  -You stop passing gas, develop significant bloating, abdominal pain  -Have blood in stools/vomit  -Have severe diarrhea/constipation  -Any other questions or concerns.  - At nights (after 4:30pm), on weekends, or if urgent, call [phone] and ask the  to speak with the on-call Colorectal Surgery resident or fellow      FOLLOW-UP  1.  You will need to follow-up with Marialuisa Glass NP in the Colon and Rectal Surgery clinic in 1 week(s) and then with CRS Staff: Dr. Mccallum in 2-3 weeks after.  Please contact our clinic scheduler Julia Garcia (phone # [phone]) if you have not heard from our clinic in 3 business days afer discharge to schedule a follow-up appointment.  2.  You may need to spin your seton to ensure the tied sutures are external to your sinus tract.  To prevent chaffing externally from the sutures, you can tuck in piece of gauze between your skin and seton.   3.  We recommend adding around the clock tylenol and ibuprofen (or NSAID of your choice) and alternate along with opiate medications.   4.  We recommend warm to hot sitz baths (as warm as you can tolerate) after each bowel movement and at least 2-3 times per day.       *For other appointments on [place] and/or [facility] (with [facility] or [facility] provider or  service): Call [phone] if you have not heard regarding these appointments within 7 days of discharge.    Appointments on [place] and/or [facility] (with [facility] or [facility] provider or service). Call [phone] if you haven't heard regarding these appointments within 7 days of discharge.                     Review of your medicines      START taking        Dose / Directions Last dose taken    naproxen 500 MG tablet   Commonly known as:  NAPROSYN   Dose:  500 mg   Quantity:  30 tablet        Take 1 tablet (500 mg) by mouth 2 times daily as needed for moderate pain   Refills:  0        traMADol 50 MG tablet   Commonly known as:  ULTRAM   Dose:  50 mg   Quantity:  20 tablet        Take 1 tablet (50 mg) by mouth every 6 hours as needed for severe pain   Refills:  0          Our records show that you are taking the medicines listed below. If these are incorrect, please call your family doctor or clinic.        Dose / Directions Last dose taken    Azelastine HCl 0.15 % Soln        Refills:  0        fexofenadine-pseudoePHEDrine 180-240 MG per 24 hr tablet   Commonly known as:  ALLEGRA-D 24   Dose:  1 tablet        Take 1 tablet by mouth   Refills:  0        levalbuterol 45 MCG/ACT Inhaler   Commonly known as:  XOPENEX HFA   Dose:  1 puff        Inhale 1 puff into the lungs   Refills:  0        methylphenidate 20 MG CR tablet   Commonly known as:  METADATE ER   Dose:  20 mg        Take 20 mg by mouth   Refills:  0        norgestimate-ethinyl estradiol 0.25-35 MG-MCG per tablet   Commonly known as:  ORTHO-CYCLEN, SPRINTEC   Dose:  1 tablet        Take 1 tablet by mouth   Refills:  0        oxyCODONE IR 5 MG tablet   Commonly known as:  ROXICODONE   Dose:  5-10 mg   Quantity:  50 tablet        Take 1-2 tablets (5-10 mg) by mouth every 6 hours as needed for moderate to severe pain or severe pain maximum 6 tablet(s) per day   Refills:  0        SUMAtriptan 100 MG tablet   Commonly known as:  IMITREX   Dose:  100 mg         Take 100 mg by mouth   Refills:  0                Information about OPIOIDS     PRESCRIPTION OPIOIDS: WHAT YOU NEED TO KNOW   You have a prescription for an opioid (narcotic) pain medicine. Opioids can cause addiction. If you have a history of chemical dependency of any type, you are at a higher risk of becoming addicted to opioids. Only take this medicine after all other options have been tried. Take it for as short a time and as few doses as possible.     Do not:    Drive. If you drive while taking these medicines, you could be arrested for driving under the influence (DUI).    Operate heavy machinery    Do any other dangerous activities while taking these medicines.     Drink any alcohol while taking these medicines.      Take with any other medicines that contain acetaminophen. Read all labels carefully. Look for the word  acetaminophen  or  Tylenol.  Ask your pharmacist if you have questions or are unsure.    Store your pills in a secure place, locked if possible. We will not replace any lost or stolen medicine. If you don t finish your medicine, please throw away (dispose) as directed by your pharmacist. The [place] [employer]ution [employer] has more information about safe disposal: https://www.pca.[facility].[place].us/living-green/managing-unwanted-medications    All opioids tend to cause constipation. Drink plenty of water and eat foods that have a lot of fiber, such as fruits, vegetables, prune juice, apple juice and high-fiber cereal. Take a laxative (Miralax, milk of magnesia, Colace, Senna) if you don t move your bowels at least every other day.         Prescriptions were sent or printed at these locations (2 Prescriptions)                   Other Prescriptions                Printed at Department/Unit printer (2 of 2)         naproxen (NAPROSYN) 500 MG tablet               traMADol (ULTRAM) 50 MG tablet                Procedures and tests performed during your visit     CBC with platelets differential    CRP  "inflammation    Comprehensive metabolic panel    Erythrocyte sedimentation rate auto    HCG qualitative pregnancy (blood)    INR    UA with Microscopic      Orders Needing Specimen Collection     None      Pending Results     No orders found from 2018 to 2018.            Pending Culture Results     No orders found from 2018 to 2018.            Pending Results Instructions     If you had any lab results that were not finalized at the time of your Discharge, you can call the ED Lab Result RN at [phone]. You will be contacted by this team for any positive Lab results or changes in treatment. The nurses are available 7 days a week from 10A to 6:30P.  You can leave a message 24 hours per day and they will return your call.        Thank you for choosing [facility]       Thank you for choosing [facility] for your care. Our goal is always to provide you with excellent care. Hearing back from our patients is one way we can continue to improve our services. Please take a few minutes to complete the written survey that you may receive in the mail after you visit with us. Thank you!        What[employer] Information     [employer] lets you send messages to your doctor, view your test results, renew your prescriptions, schedule appointments and more. To sign up, go to [email or web address] . Click on \"Log in\" on the left side of the screen, which will take you to the Welcome page. Then click on \"Sign up Now\" on the right side of the page.     You will be asked to enter the access code listed below, as well as some personal information. Please follow the directions to create your username and password.     Your access code is: [email or web address]  Expires: 2018  6:30 AM     Your access code will  in 90 days. If you need help or a new code, please call your [facility] clinic or [phone].        Care EveryWhere ID     This is your Care EveryWhere ID. This could be used by other organizations to access your " [place] medical records  [id number]        Equal Access to Services     KARLOS OLIVEIRA : Rosa Powell, martin bee, stella irvin, julia abreu. So [facility] [phone].    ATENCIÓN: Si habla español, tiene a liu disposición servicios gratuitos de asistencia lingüística. Llame al [phone].    We comply with applicable federal civil rights laws and [place] laws. We do not discriminate on the basis of race, color, national origin, age, disability, sex, sexual orientation, or gender identity.            After Visit Summary       This is your record. Keep this with you and show to your community pharmacist(s) and doctor(s) at your next visit.

## 2018-04-27 NOTE — ED AVS SNAPSHOT
[facility], [place], Emergency Department    [address] [place] [address]    Phone:  [phone]                                       Natasha Woodruff   MRN: [id number]    Department:  [facility], Emergency Department   Date of Visit:  4/27/2018           After Visit Summary Signature Page     I have received my discharge instructions, and my questions have been answered. I have discussed any challenges I see with this plan with the nurse or doctor.    ..........................................................................................................................................  Patient/Patient Representative Signature      ..........................................................................................................................................  Patient Representative Print Name and Relationship to Patient    ..................................................               ................................................  Date                                            Time    ..........................................................................................................................................  Reviewed by Signature/Title    ...................................................              ..............................................  Date                                                            Time

## 2018-04-27 NOTE — CONSULTS
Colon and Rectal Surgery Consultation Note  [facility]    Natasha Woodruff MRN# [id number]   Age: 37 year old YOB: 1980     Date of Admission:  4/27/2018    Reason for consult: Perineal pain       Requesting physician: Dr. 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  On 4/20/2018 underwent an exam under anesthesia, wound debridement of perineal sinus tract (Plastics) and fistulotomy and seton placement (Colorectal).  Pt presents to ED with progressively worsening pain likely due to poor post-operative pain control and possibly due to seton sutures with in the sinus tract.          Recommendations:   - recommend tylenol 1000mg QID scheduled, naproxyn 400mg BID scheduled along with oxycodone prn.  If not tolerating oxycodone, can try hydromorphone or tramadol.   - recommend 2-3 sitz baths per day, at least after every bowel movement  - pt does not have to continue packing her wound  - keep clean and dry  - can tuck a piece of gauze between buttocks to help prevent chaffing from seton  - f/u in colorectal clinic in 7-10 days with Marialuisa Glass NP and then with Dr. Mccallum in a 3-4 weeks after. (message has been sent to our clinic to help facilitate appt)  - f/u with Plastic surgery as already scheduled   - discussed with ED staff.  Defer opiate choice to ED staff.  - Colon and Rectal Surgery AnoRectal Surgery Post-Operative recommendations were inserted into DC AVS.           History of Present Illness:   CC: perineal pain     Pt reports increasing perineal pain.  Had a very difficult time packing her wound today due to pain.  Has had continued drainage but hard for her to tell where it is coming from.  Also noticed some blood.  Denies fevers.  Had some nausea and feels it  is either due to her headache versus the oxycodone. Pt feels that the oxycodone is making her sick.  Pt having bowel movements. Pt has not been taking any tylenol nor ibuprofen.  Pt has not been taking any warm baths as she felt that it would potentially cause her more infection with an open wound.  Pt very frustrated because she feels as though there was poor post-operative counseling and education.  Had multiple questions regarding the seton itself.     Seton was readjusted after being given extra IV pain medications.            Past Medical History:     Past Medical History:   Diagnosis Date     Ganglion cyst of right groin      Vertigo              Past Surgical History:     Past Surgical History:   Procedure Laterality Date     CYSTECTOMY PILONIDAL       ENT SURGERY      tonsillectomy, bleeding afterwards     IRRIGATION AND DEBRIDEMENT PERINEAL, COMBINED N/A 4/20/2018    Procedure: COMBINED IRRIGATION AND DEBRIDEMENT PERINEAL;  Debridement Perineal Sinus Tract, Fistulotomy with seton placement;  Surgeon: Aaliyah De La Fuente MD;  Location:  OR             Social History:     Social History     Social History     Marital status: Single     Spouse name: N/A     Number of children: N/A     Years of education: N/A     Occupational History     Not on file.     Social History Main Topics     Smoking status: Former Smoker     Types: Cigarettes     Smokeless tobacco: Never Used      Comment: 5 years ago     Alcohol use Yes      Comment: 1-2 glasses wine daily     Drug use: No     Sexual activity: Not on file     Other Topics Concern     Not on file     Social History Narrative             Family History:   No family history on file.          Allergies:      Allergies   Allergen Reactions     Liquid Adhesive Rash     Sulfamethoxazole-Trimethoprim Rash     Sulfasalazine Rash             Medications:     No current facility-administered medications on file prior to encounter.   Current Outpatient Prescriptions on File  "Prior to Encounter:  oxyCODONE IR (ROXICODONE) 5 MG tablet Take 1-2 tablets (5-10 mg) by mouth every 6 hours as needed for moderate to severe pain or severe pain maximum 6 tablet(s) per day   Azelastine HCl 0.15 % SOLN    fexofenadine-pseudoePHEDrine (ALLEGRA-D 24) 180-240 MG per 24 hr tablet Take 1 tablet by mouth   levalbuterol (XOPENEX HFA) 45 MCG/ACT Inhaler Inhale 1 puff into the lungs   methylphenidate (METADATE ER) 20 MG CR tablet Take 20 mg by mouth   norgestimate-ethinyl estradiol (ORTHO-CYCLEN, SPRINTEC) 0.25-35 MG-MCG per tablet Take 1 tablet by mouth   SUMAtriptan (IMITREX) 100 MG tablet Take 100 mg by mouth             Review of Systems:      All other review of systems negative, except for what is mentioned above        Physical Exam:   /60  Pulse 86  Temp 98.5  F (36.9  C) (Oral)  Resp 18  Ht 1.676 m (5' 6\")  Wt 77.1 kg (170 lb)  LMP 04/10/2018 (Exact Date)  SpO2 97%  Breastfeeding? No  BMI 27.44 kg/m2  General: Alert, interactive, very uncomfortable appearing  Resp: CTAB, normal resp effort  Cardiac: RRR,  Abdomen: Soft, nontender, nondistended. +BS.  No rebound or guarding.  External perineal exam:  Nickel-sized wound on right anterolateral side just lateral to vulva. Seton in place.  Seton is slightly under tension.  Seton sutures are not visible.  No surrounding erythema of wound.  Widely patent wound that is draining well.    Seton was spun so sutures are external.  Pt was asked to palpate her seton so she could learn how to spin the seton as needed at home for comfort.  Extremities: No LE edema or obvious joint abnormalities  Skin: Warm and dry, no jaundice or rash  Neuro: A&Ox3, CN 2-12 intact, HILARIO Kennedy PARICHELLE   Colon and Rectal Surgery  8097    Patient was seen and examined with Fellow Dr. Bridges and then with attending Dr. Mccallum. Final counseling was done with patient's father present.          "

## 2018-04-27 NOTE — ED TRIAGE NOTES
Pt had fistula repair and removal of recurrent cyst from her sinus tract last week Fri. She comes in today with c/o severe headache, nausea, increased bleeding from surgery site after packing was removed this am. She also stated the band used for the fistula repair is cutting through her skin.

## 2018-04-27 NOTE — TELEPHONE ENCOUNTER
Patient called in through triage stating she was having extreme perineal / perianal pain. Patient also states her Seton was cutting into her skin approximately 1/2 of an inch. Patient states she has been in constant pain since her procedure and has been feeling the need to puke and has been shaking the pain is so severe. Patient also states she isn't sure if the pain is coming from her perineal area or her perianal area. Patient was advised if she was having this severe of pain and has been having this severe of pain since surgery she should be assess in our Emergency Department. She was given the address of [address], [address].

## 2018-05-01 ENCOUNTER — OFFICE VISIT (OUTPATIENT)
Dept: PLASTIC SURGERY | Facility: CLINIC | Age: 38
End: 2018-05-01
Payer: COMMERCIAL

## 2018-05-01 ENCOUNTER — OFFICE VISIT (OUTPATIENT)
Dept: SURGERY | Facility: CLINIC | Age: 38
End: 2018-05-01
Payer: COMMERCIAL

## 2018-05-01 DIAGNOSIS — K60.30 ANAL FISTULA: ICD-10-CM

## 2018-05-01 DIAGNOSIS — Z09 FOLLOW-UP EXAMINATION FOLLOWING SURGERY: Primary | ICD-10-CM

## 2018-05-01 DIAGNOSIS — S31.809D OPEN WOUND OF BUTTOCK, UNSPECIFIED LATERALITY, SUBSEQUENT ENCOUNTER: Primary | ICD-10-CM

## 2018-05-01 DIAGNOSIS — K60.50 ANORECTAL FISTULA: ICD-10-CM

## 2018-05-01 NOTE — MR AVS SNAPSHOT
After Visit Summary   5/1/2018    Natasha Woodruff    MRN: [id number]           Patient Information     Date Of Birth          1980        Visit Information        Provider Department      5/1/2018 4:30 PM Aaliyah De La Fuente MD [facility] Plastic and Reconstructive Surgery        Today's Diagnoses     Open wound of buttock, unspecified laterality, subsequent encounter    -  1    Anal fistula           Follow-ups after your visit        Who to contact     Please call your clinic at [phone] to:    Ask questions about your health    Make or cancel appointments    Discuss your medicines    Learn about your test results    Speak to your doctor            Additional Information About Your Visit        MyChart Information     [email or web address] gives you secure access to your electronic health record. If you see a primary care provider, you can also send messages to your care team and make appointments. If you have questions, please call your primary care clinic.  If you do not have a primary care provider, please call [phone] and they will assist you.      [email or web address] is an electronic gateway that provides easy, online access to your medical records. With [email or web address], you can request a clinic appointment, read your test results, renew a prescription or communicate with your care team.     To access your existing account, please contact your [facility] or call [phone] for assistance.        Care EveryWhere ID     This is your Care EveryWhere ID. This could be used by other organizations to access your [facility] medical records  [id number]        Your Vitals Were     Last Period                   04/10/2018 (Exact Date)            Blood Pressure from Last 3 Encounters:   04/27/18 123/65   04/20/18 117/56   02/20/18 136/78    Weight from Last 3 Encounters:   04/27/18 170 lb   04/20/18 174 lb   02/20/18 174 lb              Today, you had the following     No orders found for  display       Primary Care Provider Office Phone # Fax #    Marialuisa Delgadillo [phone]       [facility] [address]  [address] [facility] [address]        Equal Access to Services     KARLOS OLIVEIRA : Hadii laura ku hadleesao Sogogoali, waaxda luqadaha, qaybta kaalmada adeegyada, julia charltondallas abreu. So [facility] [phone].    ATENCIÓN: Si habla español, tiene a liu disposición servicios gratuitos de asistencia lingüística. [place] [phone].    We comply with applicable federal civil rights laws and [place] laws. We do not discriminate on the basis of race, color, national origin, age, disability, sex, sexual orientation, or gender identity.            Thank you!     Thank you for choosing [facility] PLASTIC AND RECONSTRUCTIVE SURGERY  for your care. Our goal is always to provide you with excellent care. Hearing back from our patients is one way we can continue to improve our services. Please take a few minutes to complete the written survey that you may receive in the mail after your visit with us. Thank you!             Your Updated Medication List - Protect others around you: Learn how to safely use, store and throw away your medicines at www.disposemymeds.org.          This list is accurate as of 5/1/18 11:59 PM.  Always use your most recent med list.                   Brand Name Dispense Instructions for use Diagnosis    Azelastine HCl 0.15 % Soln           fexofenadine-pseudoePHEDrine 180-240 MG per 24 hr tablet    ALLEGRA-D 24     Take 1 tablet by mouth        levalbuterol 45 MCG/ACT Inhaler    XOPENEX HFA     Inhale 1 puff into the lungs        methylphenidate 20 MG CR tablet    METADATE ER     Take 20 mg by mouth        naproxen 500 MG tablet    NAPROSYN    30 tablet    Take 1 tablet (500 mg) by mouth 2 times daily as needed for moderate pain        norgestimate-ethinyl estradiol 0.25-35 MG-MCG per tablet    ORTHO-CYCLEN, SPRINTEC     Take 1 tablet by mouth         oxyCODONE IR 5 MG tablet    ROXICODONE    50 tablet    Take 1-2 tablets (5-10 mg) by mouth every 6 hours as needed for moderate to severe pain or severe pain maximum 6 tablet(s) per day    Anal fistula       SUMAtriptan 100 MG tablet    IMITREX     Take 100 mg by mouth        traMADol 50 MG tablet    ULTRAM    20 tablet    Take 1 tablet (50 mg) by mouth every 6 hours as needed for severe pain

## 2018-05-01 NOTE — LETTER
"5/1/2018       RE: Natasha Woodruff  [address]  [place] [address]     Dear Colleague,    Thank you for referring your patient, Natasha Woodruff, to the [facility] PLASTIC AND RECONSTRUCTIVE SURGERY at [facility]. Please see a copy of my visit note below.    PLASTICS POSTOP    This 37 year old female returns following her recent procedure for a chronic draining sinus tract near her posterior perineum.    While the wound was debrided, we discovered the source to be a previously undetected anal fistula. Dr Mccallum from Colorectal assisted in doing a fistulotomy and seton placement at the time rather than wait 6 weeks for decreased inflammation.    Unfortunately, we were unable to provide more information postoperatively on the long-term expectations with setons and she ended up in the ER over the weekend for pain. She did not feel adequately informed about her fistula or the seton when talking with the Colorectal team and she is very angry and sad today.    Both the Colorectal PA and I met with the patient in clinic and spent considerable time trying to explain things for her so that her situation did not seem so hopeless. The wound actually is healing quite quickly without signs of infection.  Despite reminding her that we still needed to get an MRI to fully assess her fistula, she seemed quite fixated on an earlier statement she heard during her ER visit that she might have a seton for life.    This then brought many tears and additional deeper issues to the surface. \"Why is this happening to me?\". \"I've always tried to be nice, and my mean older sister gets the fistula that has a 95% chance of being fixed, not just a 50% chance\". \"I'll never be able to date with this seton.\" \"I won't be able to go boating without worrying about the seton hanging out of my swimsuit.\" She even stated that she wasn't sure she could live with this situation.    I gave her my phone number if she " had any other questions or concerns. Colorectal will set her up with Dr Kve Shaw and an MRI if she wants to follow up at the .     Total time = 45 minutes, greater than half spent helping patient deal with her emotions and understand her options.         Again, thank you for allowing me to participate in the care of your patient.      Sincerely,    Aaliyah De La Fuente MD

## 2018-05-01 NOTE — MR AVS SNAPSHOT
After Visit Summary   5/1/2018    Natasha Woodruff    MRN: [id number]           Patient Information     Date Of Birth          1980        Visit Information        Provider Department      5/1/2018 4:45 PM Marialuisa Lang APRN [facility] Colon and Rectal Surgery        Today's Diagnoses     Follow-up examination following surgery    -  1    Anorectal fistula           Follow-ups after your visit        Your next 10 appointments already scheduled     May 15, 2018  4:00 PM CDT   MR PELVIS W/O & W CONTRAST with UUMR2   [facility] [place], [facility] ([facility], [facility])    [address] [facility] [address] [phone]           Take your medicines as usual, unless your doctor tells you not to. Bring a list of your current medicines to your exam (including vitamins, minerals and over-the-counter drugs).  You may or may not receive intravenous (IV) contrast for this exam pending the discretion of the Radiologist.  You do not need to do anything special to prepare.  The MRI machine uses a strong magnet. Please wear clothes without metal (snaps, zippers). A sweatsuit works well, or we may give you a hospital gown.  Please remove any body piercings and hair extensions before you arrive. You will also remove watches, jewelry, hairpins, wallets, dentures, partial dental plates and hearing aids. You may wear contact lenses, and you may be able to wear your rings. We have a safe place to keep your personal items, but it is safer to leave them at home.  **IMPORTANT** THE INSTRUCTIONS BELOW ARE ONLY FOR THOSE PATIENTS WHO HAVE BEEN PRESCRIBED SEDATION OR GENERAL ANESTHESIA DURING THEIR MRI PROCEDURE:  IF YOUR DOCTOR PRESCRIBED ORAL SEDATION (take medicine to help you relax during your exam):   You must get the medicine from your doctor (oral medication) before you arrive. Bring the medicine to the exam. Do not take it at home. You ll be told  when to take it upon arriving for your exam.   Arrive one hour early. Bring someone who can take you home after the test. Your medicine will make you sleepy. After the exam, you may not drive, take a bus or take a taxi by yourself.  IF YOUR DOCTOR PRESCRIBED IV SEDATION:   Arrive one hour early. Bring someone who can take you home after the test. Your medicine will make you sleepy. After the exam, you may not drive, take a bus or take a taxi by yourself.   No eating 6 hours before your exam. You may have clear liquids up until 4 hours before your exam. (Clear liquids include water, clear tea, black coffee and fruit juice without pulp.)  IF YOUR DOCTOR PRESCRIBED ANESTHESIA (be asleep for your exam):   Arrive 1 1/2 hours early. Bring someone who can take you home after the test. You may not drive, take a bus or take a taxi by yourself.   No eating 8 hours before your exam. You may have clear liquids up until 4 hours before your exam. (Clear liquids include water, clear tea, black coffee and fruit juice without pulp.)   You will spend four to five hours in the recovery room.  Please call the Imaging Department at your exam site with any questions.              Future tests that were ordered for you today     Open Future Orders        Priority Expected Expires Ordered    MR Pelvis w/o & w Contrast Routine  5/2/2019 5/1/2018            Who to contact     Please call your clinic at [phone] to:    Ask questions about your health    Make or cancel appointments    Discuss your medicines    Learn about your test results    Speak to your doctor            Additional Information About Your Visit        MyChart Information     [employer] is an electronic gateway that provides easy, online access to your medical records. With [employer], you can request a clinic appointment, read your test results, renew a prescription or communicate with your care team.     To sign up for [employer] visit the website at [email or web address]    You will be asked to enter the access code listed below, as well as some personal information. Please follow the directions to create your username and password.     Your access code is: [email or web address]  Expires: 2018  6:30 AM     Your access code will  in 90 days. If you need help or a new code, please contact your [facility] or call [phone] for assistance.        Care EveryWhere ID     This is your Care EveryWhere ID. This could be used by other organizations to access your [facility] medical records  [id number]        Your Vitals Were     Last Period                   04/10/2018 (Exact Date)            Blood Pressure from Last 3 Encounters:   18 123/65   18 117/56   18 136/78    Weight from Last 3 Encounters:   18 170 lb   18 174 lb   18 174 lb               Primary Care Provider Office Phone # Fax #    Marialuisa Delgadillo [phone]       [facility] [address] [facility] [address]        Equal Access to Services     [facility]: Hadii aad ku hadasho Soomaali, waaxda luqadaha, qaybta kaalmada adeegyada, julia cole . So [facility] [phone].    ATENCIÓN: Si habla español, tiene a liu disposición servicios gratuitos de asistencia lingüística. [facility] [phone].    We comply with applicable federal civil rights laws and [place] laws. We do not discriminate on the basis of race, color, national origin, age, disability, sex, sexual orientation, or gender identity.            Thank you!     Thank you for choosing [facility] COLON AND RECTAL SURGERY  for your care. Our goal is always to provide you with excellent care. Hearing back from our patients is one way we can continue to improve our services. Please take a few minutes to complete the written survey that you may receive in the mail after your visit with us. Thank you!             Your Updated Medication List - Protect others  around you: Learn how to safely use, store and throw away your medicines at www.disposemymeds.org.          This list is accurate as of 5/1/18  5:44 PM.  Always use your most recent med list.                   Brand Name Dispense Instructions for use Diagnosis    Azelastine HCl 0.15 % Soln           fexofenadine-pseudoePHEDrine 180-240 MG per 24 hr tablet    ALLEGRA-D 24     Take 1 tablet by mouth        levalbuterol 45 MCG/ACT Inhaler    XOPENEX HFA     Inhale 1 puff into the lungs        methylphenidate 20 MG CR tablet    METADATE ER     Take 20 mg by mouth        naproxen 500 MG tablet    NAPROSYN    30 tablet    Take 1 tablet (500 mg) by mouth 2 times daily as needed for moderate pain        norgestimate-ethinyl estradiol 0.25-35 MG-MCG per tablet    ORTHO-CYCLEN, SPRINTEC     Take 1 tablet by mouth        oxyCODONE IR 5 MG tablet    ROXICODONE    50 tablet    Take 1-2 tablets (5-10 mg) by mouth every 6 hours as needed for moderate to severe pain or severe pain maximum 6 tablet(s) per day    Anal fistula       SUMAtriptan 100 MG tablet    IMITREX     Take 100 mg by mouth        traMADol 50 MG tablet    ULTRAM    20 tablet    Take 1 tablet (50 mg) by mouth every 6 hours as needed for severe pain

## 2018-05-01 NOTE — PROGRESS NOTES
Colon and Rectal Surgery Postoperative Clinic Note    RE: Natasha Woodruff  : 1980  MANNY: 2018    Natasha Woodruff is a very pleasant 37 year old female with a history of a refractory anterior perineal sinus with multiple debridements in the past who is now status post sharp excisional debridement of perianal sinus tract with Dr. De La Fuente and fistulotomy and seton placement with Dr. Mccallum on 18.   She presented to the ER on 18 for nausea, headache, and pain at seton site. Seton was rotated and it was recommended she manage pain with naproxen, tramadol, and sitz baths. She presents today for follow up with me and Dr. De La Fuente.    Interval history: Natasha is very upset about her seton drain. She reports that the pain is now tolerable but she does not see a seton drain as an acceptable long term solution for her fistula. She continues to have some drainage and is using a gauze to collect this. She denies any difficulty with bowel movements. She denies any incontinence of stool. She reports that her sister also had a fistula tract. No family history of IBD.     Assessment/Plan:  37 year old female status post sharp excisional debridement of perianal sinus tract with Dr. De La Fuente and fistulotomy and seton placement with Dr. Mccallum on 18. On exam seton in place with fistula and small wound in the anterior position. The wound is quite small and with good granulation tissue. Minimal drainage present. No induration, fluctuance, or erythema. I had a very long discussion with Natasha regarding the nature of fistula tracts. Discussed that definitive management depends on where the tract goes and muscle involvement.  Recommended a 3T MRI in 3-4 more weeks to evaluate for any side tracts. Would like to wait for the MRI to allow inflammation to decrease. Discussed that if this represents a high fistula, that it may be managed with a LIFT procedure but did discuss that this is not always sucessful. She is upset  that there is a possibility that she may have to have a seton permanently. Discussed that we need more information from the MRI before determining how to manage it further. She states that having a seton long term is not acceptable to her. She would like to ensure that she does not have Crohn's disease since her sister also had a fistula. I think this is reasonable to evaluate and we can set her up for a colonoscopy when she feels healed enough to have this. Will have her follow up with Dr. Mccallum following her MRI and colonoscopy to discuss further management.    Medical history:  Past Medical History:   Diagnosis Date     Ganglion cyst of right groin      Vertigo        Surgical history:  Past Surgical History:   Procedure Laterality Date     CYSTECTOMY PILONIDAL       ENT SURGERY      tonsillectomy, bleeding afterwards     IRRIGATION AND DEBRIDEMENT PERINEAL, COMBINED N/A 4/20/2018    Procedure: COMBINED IRRIGATION AND DEBRIDEMENT PERINEAL;  Debridement Perineal Sinus Tract, Fistulotomy with seton placement;  Surgeon: Aaliyah De La Fuente MD;  Location: [facility] OR       Problem list:    Patient Active Problem List    Diagnosis Date Noted     Open wound 09/22/2017     Priority: Medium     Skin cyst 04/25/2017     Priority: Medium     Overview:   Added automatically from request for surgery [id number]       ADD (attention deficit disorder) 07/09/2010     Priority: Medium     Anxiety state 07/09/2010     Priority: Medium     Hyperkinetic syndrome of childhood 07/09/2010     Priority: Medium     Esophageal reflux 04/04/2006     Priority: Medium     Mild intermittent asthma without complication 04/04/2006     Priority: Medium     Overview:   exercise induced       Other acne 04/04/2006     Priority: Medium       Medications:  Current Outpatient Prescriptions   Medication Sig Dispense Refill     Azelastine HCl 0.15 % SOLN        fexofenadine-pseudoePHEDrine (ALLEGRA-D 24) 180-240 MG per 24 hr tablet Take 1 tablet by mouth        levalbuterol (XOPENEX HFA) 45 MCG/ACT Inhaler Inhale 1 puff into the lungs       methylphenidate (METADATE ER) 20 MG CR tablet Take 20 mg by mouth       naproxen (NAPROSYN) 500 MG tablet Take 1 tablet (500 mg) by mouth 2 times daily as needed for moderate pain 30 tablet 0     norgestimate-ethinyl estradiol (ORTHO-CYCLEN, SPRINTEC) 0.25-35 MG-MCG per tablet Take 1 tablet by mouth       oxyCODONE IR (ROXICODONE) 5 MG tablet Take 1-2 tablets (5-10 mg) by mouth every 6 hours as needed for moderate to severe pain or severe pain maximum 6 tablet(s) per day 50 tablet 0     SUMAtriptan (IMITREX) 100 MG tablet Take 100 mg by mouth       traMADol (ULTRAM) 50 MG tablet Take 1 tablet (50 mg) by mouth every 6 hours as needed for severe pain 20 tablet 0       Allergies:  Allergies   Allergen Reactions     Liquid Adhesive Rash     Sulfamethoxazole-Trimethoprim Rash     Sulfasalazine Rash       Family history:  No family history on file.    Social history:  Social History   Substance Use Topics     Smoking status: Former Smoker     Types: Cigarettes     Smokeless tobacco: Never Used      Comment: 5 years ago     Alcohol use Yes      Comment: 1-2 glasses wine daily     Marital status: single.    There are no exam notes on file for this visit.     Physical Examination: Exam was chaperoned by Dr. Yenni De La Fuente  LMP 04/10/2018 (Exact Date)  General: alert, oriented, in no acute distress, sitting comfortably; tearful during vist  HEENT: mucous membranes moist  Perianal external examination:  Seton present in the anterior midline with small wound externally with good granulation tissue. Minimal drainage. No erythema, induration, or fluctuance.    Digital rectal examination: Was deferred.    Anoscopy: Was deferred.    Total face to face time was 30 minutes, >50% counseling.   This is a postop visit.    JUANJOSE Gallaghre, NP-C  Colon and Rectal Surgery  [facility]    This note was created  using speech recognition software and may contain unintended word substitutions.

## 2018-05-08 ENCOUNTER — HEALTH MAINTENANCE LETTER (OUTPATIENT)
Age: 38
End: 2018-05-08

## 2018-05-14 NOTE — PROGRESS NOTES
"PLASTICS POSTOP    This 37 year old female returns following her recent procedure for a chronic draining sinus tract near her posterior perineum.    While the wound was debrided, we discovered the source to be a previously undetected anal fistula. Dr Mccallum from Colorectal assisted in doing a fistulotomy and seton placement at the time rather than wait 6 weeks for decreased inflammation.    Unfortunately, we were unable to provide more information postoperatively on the long-term expectations with setons and she ended up in the ER over the weekend for pain. She did not feel adequately informed about her fistula or the seton when talking with the Colorectal team and she is very angry and sad today.    Both the Colorectal PA and I met with the patient in clinic and spent considerable time trying to explain things for her so that her situation did not seem so hopeless. The wound actually is healing quite quickly without signs of infection.  Despite reminding her that we still needed to get an MRI to fully assess her fistula, she seemed quite fixated on an earlier statement she heard during her ER visit that she might have a seton for life.    This then brought many tears and additional deeper issues to the surface. \"Why is this happening to me?\". \"I've always tried to be nice, and my mean older sister gets the fistula that has a 95% chance of being fixed, not just a 50% chance\". \"I'll never be able to date with this seton.\" \"I won't be able to go boating without worrying about the seton hanging out of my swimsuit.\" She even stated that she wasn't sure she could live with this situation.    I gave her my phone number if she had any other questions or concerns. Colorectal will set her up with Dr Kev Shaw and an MRI if she wants to follow up at the .     Total time = 45 minutes, greater than half spent helping patient deal with her emotions and understand her options.    "

## 2020-02-08 ENCOUNTER — HOSPITAL ENCOUNTER (EMERGENCY)
Facility: CLINIC | Age: 40
Discharge: LEFT BEFORE DISCHARGE | End: 2020-02-08
Attending: EMERGENCY MEDICINE | Admitting: EMERGENCY MEDICINE
Payer: COMMERCIAL

## 2020-02-08 VITALS
RESPIRATION RATE: 18 BRPM | OXYGEN SATURATION: 98 % | SYSTOLIC BLOOD PRESSURE: 154 MMHG | TEMPERATURE: 98.3 F | DIASTOLIC BLOOD PRESSURE: 88 MMHG | HEART RATE: 90 BPM

## 2020-02-08 DIAGNOSIS — K60.30 ANAL FISTULA: ICD-10-CM

## 2020-02-08 PROCEDURE — 99281 EMR DPT VST MAYX REQ PHY/QHP: CPT

## 2020-02-08 ASSESSMENT — ENCOUNTER SYMPTOMS: RECTAL PAIN: 1

## 2020-02-09 NOTE — ED PROVIDER NOTES
"  History     Chief Complaint:  Seton fell out    HPI   Natasha Woodruff is a 39 year old female with a history of an anal fistula who presents for evaluation of a seton issue. The patient reports she that her seton, which was placed by colorectal surgery at the [facility] in 2018, came unstitched today. She denies any specific precipitating event leading to the unstitching and states this has never happened before. She did not call her colorectal surgeon that performed the procedure prior to arrival and refuses to call him because \"it was not a good experience.\"     Allergies:  Liquid Adhesive  Sulfamethoxazole-Trimethoprim  Sulfasalazine  Bacitracin Zinc-Polymyxin      Medications:    Allegra  Levoalbuterol   Methylphenidate  Naproxen  Imitrex  Tramadol      Past Medical History:    Ganglion cyst of right groin  Vertigo  ADD  Anxiety   Asthma  Anal fistula   Esophageal reflux  Pyelonephritis     Past Surgical History:    Cystectomy pilonidal   Bartholin gland cyst excision  Tonsillectomy   Irrigation and debridement perineal, combined    Family History:    Mother: hyperlipidemia     Social History:  The patient was unaccompanied to the ED.  Smoking Status: Former Smoker  Smokeless Tobacco: Never Used  Alcohol Use: Positive  Drug Use: Negative   Marital Status:  Single [1]     Review of Systems   Gastrointestinal: Positive for rectal pain.   All other systems reviewed and are negative.        Physical Exam     Patient Vitals for the past 24 hrs:   BP Temp Pulse Heart Rate Resp SpO2   02/08/20 2100 (!) 154/88 98.3  F (36.8  C) 90 90 18 98 %        Physical Exam  She is generally anxious, well appearing  : seton vesicle loops in the perineum that are loose and held in place by duct tape  Neuro: awake, alert, appropriate   Skin: no purulence or erythema along the perineum and site of seton  Emergency Department Course     Emergency Department Course:   Past medical records, nursing notes, and vitals " "reviewed.  2215: I performed an exam of the patient and obtained history, as documented above.    2225 I examined the patient with a female chaperone.     2235 Patient leaving the emergency department AMA.    Impression & Plan        Medical Decision Making:  Natasha Woodruff is a 39 year old female with a history of an anal fistula with a seton that was placed by colorectal surgery at the [facility] in 2018, who is here because her seton \"fell out.\" On exam, I note that her seton does appear to be loose and is held in place by duct tape that patient self-administered. I did offer to discuss with colorectal surgery to arrange for outpatient follow-up. I certainly do not feel comfortable suturing this back in myself. Ultimately, she was unwilling to wait for colorectal consultation, and left against medical advise. She is offered to return at any point.     Diagnosis:    ICD-10-CM    1. Anal fistula K60.3        Disposition:  Left AMA    I, Susan Buchanan, am serving as a scribe on 2/8/2020 at 10:31 PM to personally document services performed by Tan Jaquez MD based on my observations and the provider's statements to me.      Susan Buchanan  2/8/2020   [facility] EMERGENCY DEPARTMENT       Tan Jaquez MD  02/09/20 0149       Tan Jaquez MD  02/09/20 0149    "

## 2020-02-09 NOTE — ED TRIAGE NOTES
Pt has anal fistula x 3 years.  Having issues related to fistula tonight.  Declines to elaborate further in triage.

## 2020-03-11 ENCOUNTER — HEALTH MAINTENANCE LETTER (OUTPATIENT)
Age: 40
End: 2020-03-11

## 2021-01-03 ENCOUNTER — HEALTH MAINTENANCE LETTER (OUTPATIENT)
Age: 41
End: 2021-01-03

## 2021-04-25 ENCOUNTER — HEALTH MAINTENANCE LETTER (OUTPATIENT)
Age: 41
End: 2021-04-25

## 2021-10-10 ENCOUNTER — HEALTH MAINTENANCE LETTER (OUTPATIENT)
Age: 41
End: 2021-10-10

## 2022-05-21 ENCOUNTER — HEALTH MAINTENANCE LETTER (OUTPATIENT)
Age: 42
End: 2022-05-21

## 2022-09-17 ENCOUNTER — HEALTH MAINTENANCE LETTER (OUTPATIENT)
Age: 42
End: 2022-09-17

## 2023-06-04 ENCOUNTER — HEALTH MAINTENANCE LETTER (OUTPATIENT)
Age: 43
End: 2023-06-04

## 2024-02-25 ENCOUNTER — HEALTH MAINTENANCE LETTER (OUTPATIENT)
Age: 44
End: 2024-02-25

## (undated) DEVICE — SU PDS II 0 CT-2 27" Z334H

## (undated) DEVICE — GLOVE PROTEXIS MICRO 6.5  2D73PM65

## (undated) DEVICE — TUBING SUCTION 12"X1/4" N612

## (undated) DEVICE — SPONGE LAP 18X18" X8435

## (undated) DEVICE — BLADE KNIFE SURG 15 371115

## (undated) DEVICE — NDL COUNTER 20CT 31142493

## (undated) DEVICE — SUCTION IRR SYSTEM W/O TIP INTERPULSE HANDPIECE 0210-100-000

## (undated) DEVICE — SU VICRYL 2-0 CT-2 27" J333H

## (undated) DEVICE — SU PDS II 2-0 SH 27" Z317H

## (undated) DEVICE — SPONGE RAY-TEC 4X8" 7318

## (undated) DEVICE — VESSEL LOOPS YELLOW MAXI 31145694

## (undated) DEVICE — SOL WATER IRRIG 1000ML BOTTLE 2F7114

## (undated) DEVICE — SU VICRYL 2-0 TIE 54" J615H

## (undated) DEVICE — PACK CYSTO CUSTOM ASC

## (undated) DEVICE — LINEN TOWEL PACK X5 5464

## (undated) DEVICE — DRSG GAUZE 4X4" TRAY 6939

## (undated) DEVICE — ESU PENCIL W/COATED BLADE E2450H

## (undated) DEVICE — LINEN TOWEL PACK X6 WHITE 5487

## (undated) DEVICE — SOL NACL 0.9% IRRIG 1000ML BOTTLE 2F7124

## (undated) DEVICE — SUCTION TIP YANKAUER W/O VENT K86

## (undated) RX ORDER — LIDOCAINE HYDROCHLORIDE 10 MG/ML
INJECTION, SOLUTION EPIDURAL; INFILTRATION; INTRACAUDAL; PERINEURAL
Status: DISPENSED
Start: 2018-04-20

## (undated) RX ORDER — FENTANYL CITRATE 50 UG/ML
INJECTION, SOLUTION INTRAMUSCULAR; INTRAVENOUS
Status: DISPENSED
Start: 2018-04-20

## (undated) RX ORDER — PROPOFOL 10 MG/ML
INJECTION, EMULSION INTRAVENOUS
Status: DISPENSED
Start: 2018-04-20

## (undated) RX ORDER — LIDOCAINE HYDROCHLORIDE 20 MG/ML
INJECTION, SOLUTION EPIDURAL; INFILTRATION; INTRACAUDAL; PERINEURAL
Status: DISPENSED
Start: 2018-04-20

## (undated) RX ORDER — GABAPENTIN 300 MG/1
CAPSULE ORAL
Status: DISPENSED
Start: 2018-04-20

## (undated) RX ORDER — GENTAMICIN 40 MG/ML
INJECTION, SOLUTION INTRAMUSCULAR; INTRAVENOUS
Status: DISPENSED
Start: 2018-04-20

## (undated) RX ORDER — OXYCODONE HYDROCHLORIDE 5 MG/1
TABLET ORAL
Status: DISPENSED
Start: 2018-04-20

## (undated) RX ORDER — KETOROLAC TROMETHAMINE 30 MG/ML
INJECTION, SOLUTION INTRAMUSCULAR; INTRAVENOUS
Status: DISPENSED
Start: 2018-04-20

## (undated) RX ORDER — DEXAMETHASONE SODIUM PHOSPHATE 4 MG/ML
INJECTION, SOLUTION INTRA-ARTICULAR; INTRALESIONAL; INTRAMUSCULAR; INTRAVENOUS; SOFT TISSUE
Status: DISPENSED
Start: 2018-04-20

## (undated) RX ORDER — ONDANSETRON 2 MG/ML
INJECTION INTRAMUSCULAR; INTRAVENOUS
Status: DISPENSED
Start: 2018-04-20

## (undated) RX ORDER — BACITRACIN 50000 [IU]/1
INJECTION, POWDER, FOR SOLUTION INTRAMUSCULAR
Status: DISPENSED
Start: 2018-04-20

## (undated) RX ORDER — ACETAMINOPHEN 325 MG/1
TABLET ORAL
Status: DISPENSED
Start: 2018-04-20

## (undated) RX ORDER — BUPIVACAINE HYDROCHLORIDE AND EPINEPHRINE 5; 5 MG/ML; UG/ML
INJECTION, SOLUTION EPIDURAL; INTRACAUDAL; PERINEURAL
Status: DISPENSED
Start: 2018-04-20

## (undated) RX ORDER — POLYMYXIN B SULFATE 500000 [IU]/1
INJECTION, POWDER, LYOPHILIZED, FOR SOLUTION INTRAMUSCULAR; INTRATHECAL; INTRAVENOUS; OPHTHALMIC
Status: DISPENSED
Start: 2018-04-20

## (undated) RX ORDER — CEFAZOLIN SODIUM 1 G/3ML
INJECTION, POWDER, FOR SOLUTION INTRAMUSCULAR; INTRAVENOUS
Status: DISPENSED
Start: 2018-04-20